# Patient Record
Sex: MALE | Race: WHITE | Employment: FULL TIME | URBAN - METROPOLITAN AREA
[De-identification: names, ages, dates, MRNs, and addresses within clinical notes are randomized per-mention and may not be internally consistent; named-entity substitution may affect disease eponyms.]

---

## 2017-02-14 DIAGNOSIS — G43.709 CHRONIC MIGRAINE WITHOUT AURA WITHOUT STATUS MIGRAINOSUS, NOT INTRACTABLE: ICD-10-CM

## 2017-02-14 RX ORDER — SUMATRIPTAN 100 MG/1
TABLET, FILM COATED ORAL
Qty: 9 TABLET | Refills: 1 | Status: SHIPPED | OUTPATIENT
Start: 2017-02-14 | End: 2017-08-16 | Stop reason: SDUPTHER

## 2017-04-13 RX ORDER — SUMATRIPTAN 50 MG/1
TABLET, FILM COATED ORAL
Qty: 9 TABLET | Refills: 1 | Status: SHIPPED | OUTPATIENT
Start: 2017-04-13 | End: 2017-06-06 | Stop reason: SDUPTHER

## 2017-06-07 RX ORDER — SUMATRIPTAN 50 MG/1
TABLET, FILM COATED ORAL
Qty: 9 TABLET | Refills: 0 | Status: SHIPPED | OUTPATIENT
Start: 2017-06-07 | End: 2017-09-01

## 2017-08-16 DIAGNOSIS — G43.709 CHRONIC MIGRAINE WITHOUT AURA WITHOUT STATUS MIGRAINOSUS, NOT INTRACTABLE: ICD-10-CM

## 2017-08-16 RX ORDER — SUMATRIPTAN 100 MG/1
TABLET, FILM COATED ORAL
Qty: 9 TABLET | Refills: 0 | Status: SHIPPED | OUTPATIENT
Start: 2017-08-16 | End: 2017-08-22 | Stop reason: SDUPTHER

## 2017-08-22 DIAGNOSIS — G43.709 CHRONIC MIGRAINE WITHOUT AURA WITHOUT STATUS MIGRAINOSUS, NOT INTRACTABLE: ICD-10-CM

## 2017-08-22 RX ORDER — SUMATRIPTAN 100 MG/1
TABLET, FILM COATED ORAL
Qty: 9 TABLET | Refills: 0 | Status: SHIPPED | OUTPATIENT
Start: 2017-08-22 | End: 2017-09-01 | Stop reason: SDUPTHER

## 2017-09-01 ENCOUNTER — OFFICE VISIT (OUTPATIENT)
Dept: FAMILY MEDICINE CLINIC | Age: 22
End: 2017-09-01
Payer: COMMERCIAL

## 2017-09-01 VITALS
HEIGHT: 70 IN | RESPIRATION RATE: 16 BRPM | SYSTOLIC BLOOD PRESSURE: 123 MMHG | WEIGHT: 165.2 LBS | HEART RATE: 56 BPM | BODY MASS INDEX: 23.65 KG/M2 | TEMPERATURE: 96.6 F | DIASTOLIC BLOOD PRESSURE: 77 MMHG

## 2017-09-01 DIAGNOSIS — G43.709 CHRONIC MIGRAINE WITHOUT AURA WITHOUT STATUS MIGRAINOSUS, NOT INTRACTABLE: ICD-10-CM

## 2017-09-01 DIAGNOSIS — Z00.00 ANNUAL PHYSICAL EXAM: Primary | ICD-10-CM

## 2017-09-01 PROCEDURE — 99395 PREV VISIT EST AGE 18-39: CPT | Performed by: INTERNAL MEDICINE

## 2017-09-01 RX ORDER — TOPIRAMATE 25 MG/1
25 TABLET ORAL 2 TIMES DAILY
Qty: 60 TABLET | Refills: 3 | Status: SHIPPED | OUTPATIENT
Start: 2017-09-01 | End: 2018-01-05 | Stop reason: SDUPTHER

## 2017-09-01 RX ORDER — SUMATRIPTAN 100 MG/1
TABLET, FILM COATED ORAL
Qty: 9 TABLET | Refills: 3 | Status: SHIPPED | OUTPATIENT
Start: 2017-09-01 | End: 2017-12-26 | Stop reason: SDUPTHER

## 2017-09-01 ASSESSMENT — ENCOUNTER SYMPTOMS
ABDOMINAL PAIN: 0
BLURRED VISION: 0
COUGH: 0
CONSTIPATION: 0
PHOTOPHOBIA: 0
NAUSEA: 0
SORE THROAT: 0
SHORTNESS OF BREATH: 0
EYE REDNESS: 0

## 2017-12-26 DIAGNOSIS — G43.709 CHRONIC MIGRAINE WITHOUT AURA WITHOUT STATUS MIGRAINOSUS, NOT INTRACTABLE: ICD-10-CM

## 2017-12-26 RX ORDER — SUMATRIPTAN 100 MG/1
TABLET, FILM COATED ORAL
Qty: 9 TABLET | Refills: 2 | Status: SHIPPED | OUTPATIENT
Start: 2017-12-26 | End: 2018-05-14 | Stop reason: SDUPTHER

## 2018-01-05 ENCOUNTER — OFFICE VISIT (OUTPATIENT)
Dept: FAMILY MEDICINE CLINIC | Age: 23
End: 2018-01-05
Payer: COMMERCIAL

## 2018-01-05 VITALS
HEART RATE: 65 BPM | SYSTOLIC BLOOD PRESSURE: 106 MMHG | TEMPERATURE: 97.4 F | HEIGHT: 70 IN | BODY MASS INDEX: 23.54 KG/M2 | RESPIRATION RATE: 16 BRPM | WEIGHT: 164.4 LBS | DIASTOLIC BLOOD PRESSURE: 54 MMHG

## 2018-01-05 DIAGNOSIS — G43.709 CHRONIC MIGRAINE WITHOUT AURA WITHOUT STATUS MIGRAINOSUS, NOT INTRACTABLE: ICD-10-CM

## 2018-01-05 PROCEDURE — G8420 CALC BMI NORM PARAMETERS: HCPCS | Performed by: INTERNAL MEDICINE

## 2018-01-05 PROCEDURE — G8427 DOCREV CUR MEDS BY ELIG CLIN: HCPCS | Performed by: INTERNAL MEDICINE

## 2018-01-05 PROCEDURE — 1036F TOBACCO NON-USER: CPT | Performed by: INTERNAL MEDICINE

## 2018-01-05 PROCEDURE — 99213 OFFICE O/P EST LOW 20 MIN: CPT | Performed by: INTERNAL MEDICINE

## 2018-01-05 PROCEDURE — G8484 FLU IMMUNIZE NO ADMIN: HCPCS | Performed by: INTERNAL MEDICINE

## 2018-01-05 RX ORDER — TOPIRAMATE 25 MG/1
25 TABLET ORAL 2 TIMES DAILY
Qty: 60 TABLET | Refills: 3 | Status: SHIPPED | OUTPATIENT
Start: 2018-01-05 | End: 2019-02-25 | Stop reason: SDUPTHER

## 2018-01-05 ASSESSMENT — PATIENT HEALTH QUESTIONNAIRE - PHQ9
SUM OF ALL RESPONSES TO PHQ9 QUESTIONS 1 & 2: 0
2. FEELING DOWN, DEPRESSED OR HOPELESS: 0
1. LITTLE INTEREST OR PLEASURE IN DOING THINGS: 0
SUM OF ALL RESPONSES TO PHQ QUESTIONS 1-9: 0

## 2018-01-05 NOTE — PROGRESS NOTES
[facility]    Progress Note    Date of patient's visit: 1/5/2018    Patient's Name:  Jamal Gonzalez    YOB: 1995            Patient Care Team:  Sylvain Ramey MD as PCP - General (Internal Medicine)    REASON FOR VISIT: Routine outpatient follow     Chief Complaint   Patient presents with    3 Month Follow-Up         HISTORY OF PRESENT ILLNESS:    History was obtained from the patient. Jamal Gonzalez is a 25 y.o. is here for Follow-up on his chronic migraines. Last time he was complaining of increased frequency and severity of headaches with 2-3 episodes a week. He was started on Topamax 25 mg. He was also told to increase his Imitrex to 100 mg for acute headaches. He is feeling much better. Since he last saw me his frequency and intensity of headaches have recently improved. He is now only getting about 3 headaches a month. Before he was running out of Imitrex every month now he he says his Imitrex last him 2-3 months. Overall he is doing well. No other concerns. He is trying to eat more regularly daily and drink more fluids. He has not had his labs done yet. He has history of Hodgkin's lymphoma. His oncologist has now released him as it has been 5 years and he is in remission.     Past Medical History:   Diagnosis Date    Headache     Hodgkin's lymphoma [facility])        Past Surgical History:   Procedure Laterality Date    NECK SURGERY      lymph node removal and biopsy    TYMPANOSTOMY TUBE PLACEMENT           ALLERGIES      Allergies   Allergen Reactions    Sulfa Antibiotics Hives       MEDICATIONS:      Current Outpatient Prescriptions on File Prior to Visit   Medication Sig Dispense Refill    SUMAtriptan (IMITREX) 100 MG tablet TAKE 1 TABLET BY MOUTH ONE TIME A DAY AS NEEDED FOR MIGRAINE 9 tablet 2    topiramate (TOPAMAX) 25 MG tablet Take 1 tablet by mouth 2 times daily 60 tablet 3     No current facility-administered medications

## 2018-01-07 ASSESSMENT — ENCOUNTER SYMPTOMS
ABDOMINAL PAIN: 0
SPUTUM PRODUCTION: 0
CONSTIPATION: 0
SHORTNESS OF BREATH: 0
BLURRED VISION: 0
SINUS PAIN: 0
COUGH: 0
NAUSEA: 0
PHOTOPHOBIA: 0

## 2018-05-14 DIAGNOSIS — G43.709 CHRONIC MIGRAINE WITHOUT AURA WITHOUT STATUS MIGRAINOSUS, NOT INTRACTABLE: ICD-10-CM

## 2018-05-15 RX ORDER — SUMATRIPTAN 100 MG/1
TABLET, FILM COATED ORAL
Qty: 9 TABLET | Refills: 3 | Status: SHIPPED | OUTPATIENT
Start: 2018-05-15 | End: 2018-10-15 | Stop reason: SDUPTHER

## 2018-10-15 ENCOUNTER — OFFICE VISIT (OUTPATIENT)
Dept: FAMILY MEDICINE CLINIC | Age: 23
End: 2018-10-15
Payer: COMMERCIAL

## 2018-10-15 VITALS
SYSTOLIC BLOOD PRESSURE: 99 MMHG | TEMPERATURE: 97.4 F | WEIGHT: 165 LBS | DIASTOLIC BLOOD PRESSURE: 56 MMHG | BODY MASS INDEX: 23.62 KG/M2 | HEIGHT: 70 IN | HEART RATE: 51 BPM | RESPIRATION RATE: 16 BRPM

## 2018-10-15 DIAGNOSIS — G43.709 CHRONIC MIGRAINE WITHOUT AURA WITHOUT STATUS MIGRAINOSUS, NOT INTRACTABLE: ICD-10-CM

## 2018-10-15 PROCEDURE — G8484 FLU IMMUNIZE NO ADMIN: HCPCS | Performed by: PHYSICIAN ASSISTANT

## 2018-10-15 PROCEDURE — G8420 CALC BMI NORM PARAMETERS: HCPCS | Performed by: PHYSICIAN ASSISTANT

## 2018-10-15 PROCEDURE — 99213 OFFICE O/P EST LOW 20 MIN: CPT | Performed by: PHYSICIAN ASSISTANT

## 2018-10-15 PROCEDURE — G8427 DOCREV CUR MEDS BY ELIG CLIN: HCPCS | Performed by: PHYSICIAN ASSISTANT

## 2018-10-15 PROCEDURE — 1036F TOBACCO NON-USER: CPT | Performed by: PHYSICIAN ASSISTANT

## 2018-10-15 RX ORDER — SUMATRIPTAN 100 MG/1
TABLET, FILM COATED ORAL
Qty: 9 TABLET | Refills: 3 | Status: SHIPPED | OUTPATIENT
Start: 2018-10-15 | End: 2018-12-13 | Stop reason: SDUPTHER

## 2018-10-15 ASSESSMENT — ENCOUNTER SYMPTOMS
VOMITING: 0
BACK PAIN: 0
SHORTNESS OF BREATH: 0
NAUSEA: 0

## 2018-12-13 DIAGNOSIS — G43.709 CHRONIC MIGRAINE WITHOUT AURA WITHOUT STATUS MIGRAINOSUS, NOT INTRACTABLE: ICD-10-CM

## 2018-12-14 RX ORDER — SUMATRIPTAN 100 MG/1
TABLET, FILM COATED ORAL
Qty: 9 TABLET | Refills: 1 | Status: SHIPPED | OUTPATIENT
Start: 2018-12-14 | End: 2019-02-11 | Stop reason: SDUPTHER

## 2019-02-25 ENCOUNTER — OFFICE VISIT (OUTPATIENT)
Dept: FAMILY MEDICINE CLINIC | Age: 24
End: 2019-02-25
Payer: COMMERCIAL

## 2019-02-25 VITALS
WEIGHT: 175 LBS | SYSTOLIC BLOOD PRESSURE: 120 MMHG | RESPIRATION RATE: 16 BRPM | TEMPERATURE: 97 F | BODY MASS INDEX: 25.05 KG/M2 | HEIGHT: 70 IN | HEART RATE: 64 BPM | DIASTOLIC BLOOD PRESSURE: 72 MMHG

## 2019-02-25 DIAGNOSIS — G43.709 CHRONIC MIGRAINE WITHOUT AURA WITHOUT STATUS MIGRAINOSUS, NOT INTRACTABLE: ICD-10-CM

## 2019-02-25 PROCEDURE — 99213 OFFICE O/P EST LOW 20 MIN: CPT | Performed by: INTERNAL MEDICINE

## 2019-02-25 PROCEDURE — G8427 DOCREV CUR MEDS BY ELIG CLIN: HCPCS | Performed by: INTERNAL MEDICINE

## 2019-02-25 PROCEDURE — G8484 FLU IMMUNIZE NO ADMIN: HCPCS | Performed by: INTERNAL MEDICINE

## 2019-02-25 PROCEDURE — 1036F TOBACCO NON-USER: CPT | Performed by: INTERNAL MEDICINE

## 2019-02-25 PROCEDURE — G8419 CALC BMI OUT NRM PARAM NOF/U: HCPCS | Performed by: INTERNAL MEDICINE

## 2019-02-25 RX ORDER — TOPIRAMATE 50 MG/1
50 TABLET, FILM COATED ORAL 2 TIMES DAILY
Qty: 60 TABLET | Refills: 3 | Status: SHIPPED | OUTPATIENT
Start: 2019-02-25 | End: 2019-06-03 | Stop reason: SDUPTHER

## 2019-02-25 ASSESSMENT — PATIENT HEALTH QUESTIONNAIRE - PHQ9
SUM OF ALL RESPONSES TO PHQ QUESTIONS 1-9: 0
2. FEELING DOWN, DEPRESSED OR HOPELESS: 0
SUM OF ALL RESPONSES TO PHQ9 QUESTIONS 1 & 2: 0
SUM OF ALL RESPONSES TO PHQ QUESTIONS 1-9: 0
1. LITTLE INTEREST OR PLEASURE IN DOING THINGS: 0

## 2019-03-29 DIAGNOSIS — G43.709 CHRONIC MIGRAINE WITHOUT AURA WITHOUT STATUS MIGRAINOSUS, NOT INTRACTABLE: ICD-10-CM

## 2019-03-29 RX ORDER — SUMATRIPTAN 100 MG/1
TABLET, FILM COATED ORAL
Qty: 9 TABLET | Refills: 3 | Status: SHIPPED | OUTPATIENT
Start: 2019-03-29 | End: 2020-01-21 | Stop reason: SDUPTHER

## 2019-06-03 ENCOUNTER — OFFICE VISIT (OUTPATIENT)
Dept: FAMILY MEDICINE CLINIC | Age: 24
End: 2019-06-03
Payer: COMMERCIAL

## 2019-06-03 VITALS
TEMPERATURE: 97.6 F | DIASTOLIC BLOOD PRESSURE: 71 MMHG | HEART RATE: 54 BPM | WEIGHT: 179.2 LBS | HEIGHT: 70 IN | BODY MASS INDEX: 25.65 KG/M2 | SYSTOLIC BLOOD PRESSURE: 117 MMHG | RESPIRATION RATE: 16 BRPM

## 2019-06-03 DIAGNOSIS — G43.709 CHRONIC MIGRAINE WITHOUT AURA WITHOUT STATUS MIGRAINOSUS, NOT INTRACTABLE: Primary | ICD-10-CM

## 2019-06-03 PROCEDURE — 99213 OFFICE O/P EST LOW 20 MIN: CPT | Performed by: INTERNAL MEDICINE

## 2019-06-03 PROCEDURE — G8419 CALC BMI OUT NRM PARAM NOF/U: HCPCS | Performed by: INTERNAL MEDICINE

## 2019-06-03 PROCEDURE — G8427 DOCREV CUR MEDS BY ELIG CLIN: HCPCS | Performed by: INTERNAL MEDICINE

## 2019-06-03 PROCEDURE — 1036F TOBACCO NON-USER: CPT | Performed by: INTERNAL MEDICINE

## 2019-06-03 RX ORDER — TOPIRAMATE 50 MG/1
50 TABLET, FILM COATED ORAL 2 TIMES DAILY
Qty: 60 TABLET | Refills: 11 | Status: SHIPPED | OUTPATIENT
Start: 2019-06-03 | End: 2020-09-10

## 2019-06-03 NOTE — PROGRESS NOTES
Visit Information    Have you changed or started any medications since your last visit including any over-the-counter medicines, vitamins, or herbal medicines? no   Are you having any side effects from any of your medications? -  no  Have you stopped taking any of your medications? Is so, why? -  no    Have you seen any other physician or provider since your last visit? No  Have you had any other diagnostic tests since your last visit? No  Have you been seen in the emergency room and/or had an admission to a hospital since we last saw you? No  Have you had your routine dental cleaning in the past 6 months? no    Have you activated your [employer] account? If not, what are your barriers?  No:      Patient Care Team:  Mary Elizabeth PA-C as PCP - General (Physician Assistant)  Mary Elizabeth PA-C as PCP - [facility]    Medical History Review  Past Medical, Family, and Social History reviewed and does not contribute to the patient presenting condition    Health Maintenance   Topic Date Due    HIV screen  03/24/2010    Varicella Vaccine (2 of 2 - 13+ 2-dose series) 12/20/2011    DTaP/Tdap/Td vaccine (6 - Tdap) 03/07/2017    Flu vaccine (Season Ended) 09/01/2019    HPV vaccine  Aged Out    Pneumococcal 0-64 years Vaccine  Aged Out

## 2019-06-03 NOTE — PROGRESS NOTES
[facility]    Progress Note    Date of patient's visit: 6/3/2019    Patient's Name:  Soumya Cordero    YOB: 1995            Patient Care Team:  Anatoly Fajardo PA-C as PCP - General (Physician Assistant)  Anatoly Fajardo PA-C as PCP - [facility]    REASON FOR VISIT: Routine outpatient follow     Chief Complaint   Patient presents with    3 Month Follow-Up         HISTORY OF PRESENT ILLNESS:    History was obtained from the patient. Soumya Cordero is a 25 y.o. is here for follow-up of his chronic migraines. Last time Topamax was increased 200 mg daily as he was having increasing headaches. He says he is doing much better. He is currently having one migraine headache a month. He is sleeping better. He is exercising daily. He has a history of Hodgkin's lymphoma. He has not seen his oncologist in 2 years. He was advised that he was in remission after 5 years of follow-up. He has not had any labs done since then. He is advised to get a CBC and other labs done but he is refusing at this point. He is also on Topamax and I would like to check a renal function. He says he'll talk to his mother and let us know. Past Medical History:   Diagnosis Date    Headache     Hodgkin's lymphoma [facility])        Past Surgical History:   Procedure Laterality Date    NECK SURGERY      lymph node removal and biopsy    TYMPANOSTOMY TUBE PLACEMENT           ALLERGIES      Allergies   Allergen Reactions    Sulfa Antibiotics Hives       MEDICATIONS:      Current Outpatient Medications on File Prior to Visit   Medication Sig Dispense Refill    SUMAtriptan (IMITREX) 100 MG tablet TAKE ONE TABLET BY MOUTH EVERY DAY AS NEEDED FOR MIGRAINE 9 tablet 3    topiramate (TOPAMAX) 50 MG tablet Take 1 tablet by mouth 2 times daily 60 tablet 3     No current facility-administered medications on file prior to visit.         SOCIAL HISTORY    Reviewed and no change from previous record. Latonya Lew  reports that he has never smoked. He has never used smokeless tobacco.    FAMILY HISTORY:    Reviewed and No change from previous visit    HEALTH MAINTENANCE DUE:      Health Maintenance Due   Topic Date Due    HIV screen  03/24/2010    Varicella Vaccine (2 of 2 - 13+ 2-dose series) 12/20/2011    DTaP/Tdap/Td vaccine (6 - Tdap) 03/07/2017       REVIEW OF SYSTEMS:    12 point review of symptoms completed and found to be normal except noted in the HPI    Review of Systems   Constitutional: Negative for fever, malaise/fatigue and weight loss. HENT: Negative for congestion, sinus pain and tinnitus.    Eyes: Negative for blurred vision and photophobia. Respiratory: Negative for cough, sputum production and shortness of breath.    Cardiovascular: Negative for chest pain, palpitations and leg swelling. Gastrointestinal: Negative for abdominal pain, constipation and nausea. Neurological: Positive for headaches. Negative for dizziness, sensory change, focal weakness and loss of consciousness. Psychiatric/Behavioral: Negative for depression and substance abuse. The patient is not nervous/anxious. PHYSICAL EXAM:     Vitals:    06/03/19 1835   BP: 117/71   Site: Left Upper Arm   Position: Sitting   Cuff Size: Medium Adult   Pulse: 54   Resp: 16   Temp: 97.6 °F (36.4 °C)   TempSrc: Oral   Weight: 179 lb 3.2 oz (81.3 kg)   Height: 5' 10.08\" (1.78 m)     Body mass index is 25.65 kg/m². BP Readings from Last 3 Encounters:   06/03/19 117/71   02/25/19 120/72   10/15/18 (!) 99/56        Wt Readings from Last 3 Encounters:   06/03/19 179 lb 3.2 oz (81.3 kg)   02/25/19 175 lb (79.4 kg)   10/15/18 165 lb (74.8 kg)       Physical Exam    HENT:  Normocephalic, Atraumatic. Neck- Normal range of motion, No tenderness, Supple. Eyes:  PERRL, EOMI, Conjunctiva normal, No discharge. Respiratory:  Normal breath sounds, No respiratory distress, No wheezing, No chest tenderness. Cardiovascular:  Normal heart rate, Normal rhythm, No murmurs  Musculoskeletal:  Intact distal pulses, No edema, No tenderness  Integument:  Warm, Dry, No erythema, No rash. Lymphatic:  No lymphadenopathy noted. Neurologic:  Alert & oriented x 3, Normal motor function, Normal sensory function, No focal deficits noted. Psychiatric:  Affect normal      LABORATORY FINDINGS:    CBC:No results found for: WBC, HGB, PLT  BMP:  No results found for: NA, K, CL, CO2, BUN, CREATININE, GLUCOSE  HEMOGLOBIN A1C: No results found for: LABA1C  MICROALBUMIN URINE: No results found for: MICROALBUR  FASTING LIPID Hoagland@[email or web address]  No results found for: LDLCALC, LDLCHOLESTEROL, LDLDIRECT    LIVER PROFILE:No results found for: ALT, AST, PROT, BILITOT, BILIDIR, LABALBU   THYROID FUNCTION: No results found for: TSH   URINEANALYSIS: No results found for: LABURIN  ASSESSMENT AND PLAN:    1. Chronic migraine without aura without status migrainosus, not intractable    - topiramate (TOPAMAX) 50 MG tablet; Take 1 tablet by mouth 2 times daily  Dispense: 60 tablet; Refill: 11          FOLLOW UP AND INSTRUCTIONS:   Return in about 1 year (around 6/3/2020). 1. Kana Darnell received counseling on the following healthy behaviors: nutrition, exercise and medication adherence    2. Reviewed prior labs and health maintenance. 3. Discussed use, benefit, and side effects of prescribed medications. Barriers to medication compliance addressed. All patient questions answered. Pt voiced understanding.        Adrian Thomas  Attending Physician, [address] [place] [address], Internal Medicine Residency Program  [address] [place] [address]  6/3/2019, 6:51 PM

## 2020-01-21 RX ORDER — SUMATRIPTAN 100 MG/1
TABLET, FILM COATED ORAL
Qty: 9 TABLET | Refills: 3 | Status: SHIPPED | OUTPATIENT
Start: 2020-01-21 | End: 2020-08-17 | Stop reason: SDUPTHER

## 2020-01-21 NOTE — TELEPHONE ENCOUNTER
Electronic medication refill request. Pharmacy on file. Please advise. Last visit: 6/3/19  Last Med refill: 6/29/19    Next Visit Date:  No future appointments.     Health Maintenance   Topic Date Due    HIV screen  03/24/2010    Varicella Vaccine (2 of 2 - 13+ 2-dose series) 12/20/2011    DTaP/Tdap/Td vaccine (6 - Tdap) 03/07/2017    Flu vaccine (1) 09/01/2019    HPV vaccine  Aged Out    Pneumococcal 0-64 years Vaccine  Aged Out       No results found for: LABA1C          ( goal A1C is < 7)   No results found for: LABMICR  No results found for: LDLCHOLESTEROL, LDLCALC    (goal LDL is <100)   No results found for: AST, ALT, BUN  BP Readings from Last 3 Encounters:   06/03/19 117/71   02/25/19 120/72   10/15/18 (!) 99/56          (goal 120/80)    All Future Testing planned in [facility]              Patient Active Problem List:     Chronic migraine without aura without status migrainosus, not intractable

## 2020-02-17 ENCOUNTER — TELEPHONE (OUTPATIENT)
Dept: FAMILY MEDICINE CLINIC | Age: 25
End: 2020-02-17

## 2020-02-17 NOTE — TELEPHONE ENCOUNTER
PT's Mother called in requesting a referral to a foot specialist. Mom stated PT has an ingrown toe nail on his big toe left foot. Mom didn't want to schedule an appointment if something could not be done in office. Mom also requested a phone call back from the office. Please advise thank you!

## 2020-05-27 RX ORDER — HYDROXYZINE PAMOATE 25 MG/1
25 CAPSULE ORAL 3 TIMES DAILY PRN
Qty: 15 CAPSULE | Refills: 0 | Status: SHIPPED | OUTPATIENT
Start: 2020-05-27 | End: 2020-06-10

## 2020-08-17 RX ORDER — SUMATRIPTAN 100 MG/1
TABLET, FILM COATED ORAL
Qty: 9 TABLET | Refills: 3 | Status: SHIPPED | OUTPATIENT
Start: 2020-08-17 | End: 2020-09-10 | Stop reason: SDUPTHER

## 2020-08-17 NOTE — TELEPHONE ENCOUNTER
patient has an appointment scheduled with Ellen on 9/2/20    Next Visit Date:  No future appointments.     Health Maintenance   Topic Date Due    HPV vaccine (1 - Male 2-dose series) 03/24/2006    HIV screen  03/24/2010    Varicella vaccine (2 of 2 - 13+ 2-dose series) 12/20/2011    DTaP/Tdap/Td vaccine (6 - Tdap) 03/07/2017    Flu vaccine (1) 09/01/2020    Hib vaccine  Completed    Hepatitis A vaccine  Aged Out    Hepatitis B vaccine  Aged Out    Meningococcal (ACWY) vaccine  Aged Out    Pneumococcal 0-64 years Vaccine  Aged Out       No results found for: LABA1C          ( goal A1C is < 7)   No results found for: LABMICR  No results found for: LDLCHOLESTEROL, LDLCALC    (goal LDL is <100)   No results found for: AST, ALT, BUN  BP Readings from Last 3 Encounters:   06/03/19 117/71   02/25/19 120/72   10/15/18 (!) 99/56          (goal 120/80)    All Future Testing planned in [facility]              Patient Active Problem List:     Chronic migraine without aura without status migrainosus, not intractable

## 2020-09-10 ENCOUNTER — VIRTUAL VISIT (OUTPATIENT)
Dept: FAMILY MEDICINE CLINIC | Age: 25
End: 2020-09-10
Payer: COMMERCIAL

## 2020-09-10 PROCEDURE — 99213 OFFICE O/P EST LOW 20 MIN: CPT | Performed by: PHYSICIAN ASSISTANT

## 2020-09-10 RX ORDER — SUMATRIPTAN 100 MG/1
TABLET, FILM COATED ORAL
Qty: 9 TABLET | Refills: 3 | Status: SHIPPED | OUTPATIENT
Start: 2020-09-10 | End: 2021-02-27

## 2020-09-10 SDOH — ECONOMIC STABILITY: FOOD INSECURITY: WITHIN THE PAST 12 MONTHS, YOU WORRIED THAT YOUR FOOD WOULD RUN OUT BEFORE YOU GOT MONEY TO BUY MORE.: NEVER TRUE

## 2020-09-10 SDOH — ECONOMIC STABILITY: FOOD INSECURITY: WITHIN THE PAST 12 MONTHS, THE FOOD YOU BOUGHT JUST DIDN'T LAST AND YOU DIDN'T HAVE MONEY TO GET MORE.: NEVER TRUE

## 2020-09-10 SDOH — ECONOMIC STABILITY: TRANSPORTATION INSECURITY
IN THE PAST 12 MONTHS, HAS THE LACK OF TRANSPORTATION KEPT YOU FROM MEDICAL APPOINTMENTS OR FROM GETTING MEDICATIONS?: NO

## 2020-09-10 SDOH — ECONOMIC STABILITY: INCOME INSECURITY: HOW HARD IS IT FOR YOU TO PAY FOR THE VERY BASICS LIKE FOOD, HOUSING, MEDICAL CARE, AND HEATING?: NOT HARD AT ALL

## 2020-09-10 SDOH — ECONOMIC STABILITY: TRANSPORTATION INSECURITY
IN THE PAST 12 MONTHS, HAS LACK OF TRANSPORTATION KEPT YOU FROM MEETINGS, WORK, OR FROM GETTING THINGS NEEDED FOR DAILY LIVING?: NO

## 2020-09-10 ASSESSMENT — ENCOUNTER SYMPTOMS
COLOR CHANGE: 0
SINUS PRESSURE: 0
RECTAL PAIN: 0
SHORTNESS OF BREATH: 0
SINUS PAIN: 0
VOMITING: 0
ABDOMINAL DISTENTION: 0
TROUBLE SWALLOWING: 0
BACK PAIN: 0
ANAL BLEEDING: 0
PHOTOPHOBIA: 0
SORE THROAT: 0
NAUSEA: 0
RHINORRHEA: 0
CONSTIPATION: 0
COUGH: 0
WHEEZING: 0
CHEST TIGHTNESS: 0
ABDOMINAL PAIN: 0
BLOOD IN STOOL: 0
DIARRHEA: 0
EYE REDNESS: 0

## 2020-09-10 ASSESSMENT — PATIENT HEALTH QUESTIONNAIRE - PHQ9
1. LITTLE INTEREST OR PLEASURE IN DOING THINGS: 0
2. FEELING DOWN, DEPRESSED OR HOPELESS: 0
SUM OF ALL RESPONSES TO PHQ9 QUESTIONS 1 & 2: 0
SUM OF ALL RESPONSES TO PHQ QUESTIONS 1-9: 0
SUM OF ALL RESPONSES TO PHQ QUESTIONS 1-9: 0

## 2020-09-10 NOTE — PROGRESS NOTES
9/10/2020    TELEHEALTH EVALUATION -- Audio/Visual (During NVJXO-71 public health emergency)    HPI:    Nava Pierre (:  1995) has requested an audio/video evaluation for the following concern(s):    Patient is establishing care. Patient's only medical history is chronic migraines. He states that he gets them consistently once or twice monthly and well controlled with Imitrex as needed. Patient was placed on Topamax twice daily at one point but he states he never took it consistently. Patient has not taken it in a long time. Patient denies any current symptoms. Patient has no other concerns today. Review of Systems   Constitutional: Negative for appetite change, chills, diaphoresis, fatigue, fever and unexpected weight change. HENT: Negative for congestion, ear discharge, ear pain, postnasal drip, rhinorrhea, sinus pressure, sinus pain, sore throat, tinnitus and trouble swallowing. Eyes: Negative for photophobia, redness and visual disturbance. Respiratory: Negative for cough, chest tightness, shortness of breath and wheezing. Cardiovascular: Negative for chest pain, palpitations and leg swelling. Gastrointestinal: Negative for abdominal distention, abdominal pain, anal bleeding, blood in stool, constipation, diarrhea, nausea, rectal pain and vomiting. Endocrine: Negative. Genitourinary: Negative for decreased urine volume, difficulty urinating, dysuria, frequency, hematuria and urgency. Musculoskeletal: Negative for arthralgias, back pain, gait problem, joint swelling, myalgias, neck pain and neck stiffness. Skin: Negative for color change, pallor and rash. Neurological: Negative for dizziness, syncope, weakness, light-headedness, numbness and headaches. Hematological: Negative for adenopathy. Does not bruise/bleed easily.    Psychiatric/Behavioral: Negative for agitation, behavioral problems, confusion, decreased concentration, dysphoric mood, hallucinations, self-injury, sleep disturbance and suicidal ideas. The patient is not nervous/anxious and is not hyperactive. Prior to Visit Medications    Medication Sig Taking? Authorizing Provider   SUMAtriptan (IMITREX) 100 MG tablet TAKE ONE TABLET BY MOUTH EVERY DAY AS NEEDED FOR MIGRAINE Yes Azam Haq PA-C       Social History     Tobacco Use    Smoking status: Never Smoker    Smokeless tobacco: Never Used   Substance Use Topics    Alcohol use: No     Alcohol/week: 0.0 standard drinks    Drug use: No        Health Maintenance   Topic Date Due    HPV vaccine (1 - Male 2-dose series) 03/24/2006    Flu vaccine (1) 09/01/2020    Varicella vaccine (2 of 2 - 13+ 2-dose series) 10/01/2020 (Originally 12/20/2011)    DTaP/Tdap/Td vaccine (6 - Tdap) 09/10/2021 (Originally 3/7/2017)    HIV screen  09/10/2021 (Originally 3/24/2010)    Hib vaccine  Completed    Hepatitis A vaccine  Aged Out    Hepatitis B vaccine  Aged Out    Meningococcal (ACWY) vaccine  Aged Out    Pneumococcal 0-64 years Vaccine  Aged Out       PHYSICAL EXAMINATION:  [ INSTRUCTIONS:  \"[x]\" Indicates a positive item  \"[]\" Indicates a negative item  -- DELETE ALL ITEMS NOT EXAMINED]  Vital Signs: (As obtained by patient/caregiver or practitioner observation)    Blood pressure-  Heart rate-    Respiratory rate-    Temperature-  Pulse oximetry-     Constitutional: [x] Appears well-developed and well-nourished [x] No apparent distress      [] Abnormal-   Mental status  [x] Alert and awake  [x] Oriented to person/place/time [x]Able to follow commands      Eyes:  EOM    [x]  Normal  [] Abnormal-  Sclera  [x]  Normal  [] Abnormal -         Discharge [x]  None visible  [] Abnormal -    HENT:   [x] Normocephalic, atraumatic.   [] Abnormal   [x] Mouth/Throat: Mucous membranes are moist.     External Ears [x] Normal  [] Abnormal-     Neck: [x] No visualized mass     Pulmonary/Chest: [x] Respiratory effort normal.  [x] No visualized signs of difficulty breathing or respiratory distress        [] Abnormal-      Musculoskeletal:   [x] Normal gait with no signs of ataxia         [x] Normal range of motion of neck        [] Abnormal-       Neurological:        [x] No Facial Asymmetry (Cranial nerve 7 motor function) (limited exam to video visit)          [x] No gaze palsy        [] Abnormal-         Skin:        [x] No significant exanthematous lesions or discoloration noted on facial skin         [] Abnormal-            Psychiatric:       [x] Normal Affect [x] No Hallucinations        [] Abnormal-     Other pertinent observable physical exam findings-     ASSESSMENT/PLAN:  1. Encounter for medical examination to establish care  Get lab work done as ordered    2. Chronic migraine without aura without status migrainosus, not intractable  Take Imitrex as needed      No follow-ups on file. Quan Heredia is a 22 y.o. male being evaluated by a Virtual Visit (video visit) encounter to address concerns as mentioned above. A caregiver was present when appropriate. Due to this being a TeleHealth encounter (During QUDGO-73 public health emergency), evaluation of the following organ systems was limited: Vitals/Constitutional/EENT/Resp/CV/GI//MS/Neuro/Skin/Heme-Lymph-Imm. Pursuant to the emergency declaration under the [facility], [address] [place] [address] authority and the [employer] Act, this Virtual Visit was conducted with patient's (and/or legal guardian's) consent, to reduce the patient's risk of exposure to COVID-19 and provide necessary medical care. The patient (and/or legal guardian) has also been advised to contact this office for worsening conditions or problems, and seek emergency medical treatment and/or call 911 if deemed necessary.      Patient identification was verified at the start of the visit: Yes    Total time spent on this encounter: 20    Services were provided through a video

## 2020-11-16 ENCOUNTER — OFFICE VISIT (OUTPATIENT)
Dept: FAMILY MEDICINE CLINIC | Age: 25
End: 2020-11-16
Payer: COMMERCIAL

## 2020-11-16 VITALS
OXYGEN SATURATION: 98 % | TEMPERATURE: 97 F | HEART RATE: 70 BPM | RESPIRATION RATE: 16 BRPM | BODY MASS INDEX: 27.92 KG/M2 | HEIGHT: 70 IN | WEIGHT: 195 LBS | SYSTOLIC BLOOD PRESSURE: 120 MMHG | DIASTOLIC BLOOD PRESSURE: 74 MMHG

## 2020-11-16 PROCEDURE — 99212 OFFICE O/P EST SF 10 MIN: CPT | Performed by: PHYSICIAN ASSISTANT

## 2020-11-16 NOTE — PROGRESS NOTES
[address] PRIMARY CARE  [address] [place] [id number]  Dept: [phone]  Dept Fax: [phone]    Office Progress Note  Date of patient's visit: 11/18/2020  Patient's Name:  Joya Núñez YOB: 1995            CELE RUIZ PA  ================================================================    REASON FOR VISIT/CHIEF COMPLAINT:  Otalgia (right )    HISTORY OF PRESENTING ILLNESS:  History was obtained from: patient. Joya Núñez is a 22 y.o. female who presents with c/o right earache. Patient states that he has had pressure in the right ear for the last 2 weeks without any fevers, chills, sinus congestion, sore throat or cough. Patient has been using Debrox drops intermittently for the last couple of days but not consistently. Patient Active Problem List   Diagnosis    Chronic migraine without aura without status migrainosus, not intractable       Health Maintenance Due   Topic Date Due    HPV vaccine (1 - Male 2-dose series) 03/24/2006    Varicella vaccine (2 of 2 - 13+ 2-dose series) 12/20/2011    Flu vaccine (1) 09/01/2020       Allergies   Allergen Reactions    Sulfa Antibiotics Hives         Current Outpatient Medications   Medication Sig Dispense Refill    SUMAtriptan (IMITREX) 100 MG tablet TAKE ONE TABLET BY MOUTH EVERY DAY AS NEEDED FOR MIGRAINE 9 tablet 3     No current facility-administered medications for this visit. Social History     Tobacco Use    Smoking status: Never Smoker    Smokeless tobacco: Never Used   Substance Use Topics    Alcohol use: No     Alcohol/week: 0.0 standard drinks    Drug use: No       Family History   Problem Relation Age of Onset    Other Mother         migraines    Cancer Maternal Grandfather         brain    Cancer Paternal Grandmother         jaw cancer        Review of Systems   Constitutional: Negative for appetite change, chills, fatigue and fever.    HENT: Positive for ear pain. Negative for congestion, dental problem, drooling, ear discharge, facial swelling, hearing loss, mouth sores, nosebleeds, postnasal drip, rhinorrhea, sinus pressure, sinus pain, sneezing, sore throat, tinnitus, trouble swallowing and voice change. Respiratory: Negative for cough, chest tightness, shortness of breath and wheezing. Cardiovascular: Negative for chest pain and palpitations. Gastrointestinal: Negative for nausea and vomiting. Neurological: Negative for dizziness, light-headedness and headaches. Physical Exam  Vitals signs and nursing note reviewed. Constitutional:       General: He is not in acute distress. Appearance: Normal appearance. He is not ill-appearing, toxic-appearing or diaphoretic. HENT:      Head: Normocephalic and atraumatic. Right Ear: Hearing normal. No decreased hearing noted. No laceration, drainage, swelling or tenderness. No middle ear effusion. There is impacted cerumen. No foreign body. No mastoid tenderness. No PE tube. Left Ear: Hearing normal. No decreased hearing noted. No laceration, drainage, swelling or tenderness. No middle ear effusion. There is impacted cerumen. No foreign body. No mastoid tenderness. No PE tube. Ears:      Comments: Patient is nontender over the bilateral tragus pinna and mastoid process. Impacted cerumen bilaterally without signs of infection. Unable to visualize TMs     Nose: Nose normal.      Mouth/Throat:      Mouth: Mucous membranes are moist.   Eyes:      General: No scleral icterus. Right eye: No discharge. Left eye: No discharge. Conjunctiva/sclera: Conjunctivae normal.   Skin:     General: Skin is warm and dry. Neurological:      General: No focal deficit present. Mental Status: He is alert and oriented to person, place, and time. Psychiatric:         Mood and Affect: Mood normal.         Behavior: Behavior normal.         Thought Content:  Thought content normal. Judgment: Judgment normal.         Vitals:    11/16/20 1116   BP: 120/74   Site: Left Upper Arm   Position: Sitting   Cuff Size: Medium Adult   Pulse: 70   Resp: 16   Temp: 97 °F (36.1 °C)   TempSrc: Temporal   SpO2: 98%   Weight: 195 lb (88.5 kg)   Height: 5' 10.08\" (1.78 m)     BP Readings from Last 3 Encounters:   11/16/20 120/74   06/03/19 117/71   02/25/19 120/72              DIAGNOSTIC FINDINGS:  CBC:No results found for: WBC, HGB, PLT    BMP:  No results found for: NA, K, CL, CO2, BUN, CREATININE, GLUCOSE      FASTING LIPID PANEL:No results found for: CHOL, HDL, TRIG    No results found for this visit on 11/16/20. ASSESSMENT AND PLAN:   Diagnosis Orders   1. Otalgia of both ears  Merline Moeller MD, Otolaryngology, [facility]   2. Bilateral impacted cerumen  Merline Moeller MD, Otolaryngology, [facility]     Attempted bilateral ear irrigation with minimal relief. Significant cerumen impaction against TMs bilaterally. Patient is instructed to use Debrox consistently for the next week and return to the office for reevaluation and irrigation    FOLLOW UP AND INSTRUCTIONS:  Return in about 1 week (around 11/23/2020), or if symptoms worsen or fail to improve. · Discussed use, benefit, and side effects of prescribed medications. Barriers to medication compliance addressed. All patient questions answered. Pt voiced understanding. · Patient instructed to return to the office if symptoms do not resolve or go directly to the ER if the symptoms worsen - patient voiced understanding.     · Patient given educational materials - see patient instructions    Scanlon Proc. Dior Minesh [address] [place] [address]  11/18/2020, 11:13 AM    This note is created with the assistance of a speech-recognition program. While intending to generate a document that actually reflects the content of the visit, the document can still have some mistakes which may not have been identified and corrected by

## 2020-11-18 ASSESSMENT — ENCOUNTER SYMPTOMS
TROUBLE SWALLOWING: 0
SORE THROAT: 0
SINUS PAIN: 0
VOICE CHANGE: 0
NAUSEA: 0
FACIAL SWELLING: 0
COUGH: 0
VOMITING: 0
RHINORRHEA: 0
CHEST TIGHTNESS: 0
WHEEZING: 0
SINUS PRESSURE: 0
SHORTNESS OF BREATH: 0

## 2021-05-27 ENCOUNTER — OFFICE VISIT (OUTPATIENT)
Dept: FAMILY MEDICINE CLINIC | Age: 26
End: 2021-05-27
Payer: COMMERCIAL

## 2021-05-27 VITALS
DIASTOLIC BLOOD PRESSURE: 89 MMHG | WEIGHT: 186 LBS | HEIGHT: 71 IN | TEMPERATURE: 98 F | BODY MASS INDEX: 26.04 KG/M2 | SYSTOLIC BLOOD PRESSURE: 130 MMHG

## 2021-05-27 DIAGNOSIS — Z11.3 ROUTINE SCREENING FOR STI (SEXUALLY TRANSMITTED INFECTION): ICD-10-CM

## 2021-05-27 DIAGNOSIS — Z13.29 THYROID DISORDER SCREENING: ICD-10-CM

## 2021-05-27 DIAGNOSIS — E55.9 VITAMIN D DEFICIENCY: ICD-10-CM

## 2021-05-27 DIAGNOSIS — Z13.0 SCREENING FOR BLOOD DISEASE: ICD-10-CM

## 2021-05-27 DIAGNOSIS — Z11.4 ENCOUNTER FOR SCREENING FOR HIV: ICD-10-CM

## 2021-05-27 DIAGNOSIS — G43.709 CHRONIC MIGRAINE WITHOUT AURA WITHOUT STATUS MIGRAINOSUS, NOT INTRACTABLE: ICD-10-CM

## 2021-05-27 DIAGNOSIS — Z13.21 ENCOUNTER FOR VITAMIN DEFICIENCY SCREENING: ICD-10-CM

## 2021-05-27 DIAGNOSIS — Z00.00 HEALTHCARE MAINTENANCE: Primary | ICD-10-CM

## 2021-05-27 DIAGNOSIS — Z13.220 LIPID SCREENING: ICD-10-CM

## 2021-05-27 DIAGNOSIS — Z13.1 DIABETES MELLITUS SCREENING: ICD-10-CM

## 2021-05-27 DIAGNOSIS — Z11.59 NEED FOR HEPATITIS C SCREENING TEST: ICD-10-CM

## 2021-05-27 DIAGNOSIS — Z85.71 HISTORY OF HODGKIN'S DISEASE: ICD-10-CM

## 2021-05-27 PROCEDURE — 99395 PREV VISIT EST AGE 18-39: CPT | Performed by: FAMILY MEDICINE

## 2021-05-27 RX ORDER — SUMATRIPTAN 100 MG/1
TABLET, FILM COATED ORAL
Qty: 27 TABLET | Refills: 1 | Status: SHIPPED | OUTPATIENT
Start: 2021-05-27 | End: 2021-09-30

## 2021-05-27 ASSESSMENT — ENCOUNTER SYMPTOMS
EYES NEGATIVE: 1
RESPIRATORY NEGATIVE: 1
GASTROINTESTINAL NEGATIVE: 1

## 2021-05-27 ASSESSMENT — PATIENT HEALTH QUESTIONNAIRE - PHQ9
SUM OF ALL RESPONSES TO PHQ QUESTIONS 1-9: 2
2. FEELING DOWN, DEPRESSED OR HOPELESS: 1
SUM OF ALL RESPONSES TO PHQ9 QUESTIONS 1 & 2: 2

## 2021-05-27 NOTE — PROGRESS NOTES
Neurological: Positive for headaches. Negative for dizziness, tremors, seizures, syncope, facial asymmetry, speech difficulty, weakness, light-headedness and numbness. Psychiatric/Behavioral: Negative. Objective:     Physical Exam  Vitals and nursing note reviewed. Constitutional:       General: He is awake. He is not in acute distress. Appearance: Normal appearance. He is normal weight. He is not ill-appearing, toxic-appearing or diaphoretic. HENT:      Head: Normocephalic and atraumatic. Right Ear: Tympanic membrane, ear canal and external ear normal.      Left Ear: Tympanic membrane, ear canal and external ear normal.      Nose: Nose normal.      Mouth/Throat:      Mouth: Mucous membranes are moist.   Eyes:      Extraocular Movements: Extraocular movements intact. Conjunctiva/sclera: Conjunctivae normal.      Pupils: Pupils are equal, round, and reactive to light. Cardiovascular:      Rate and Rhythm: Normal rate and regular rhythm. Pulses: Normal pulses. Heart sounds: Normal heart sounds. No murmur heard. No friction rub. No gallop. Pulmonary:      Effort: Pulmonary effort is normal. No respiratory distress. Breath sounds: Normal breath sounds. No stridor. No wheezing, rhonchi or rales. Abdominal:      General: Abdomen is flat. Bowel sounds are normal.      Palpations: Abdomen is soft. Musculoskeletal:         General: Normal range of motion. Cervical back: Normal range of motion and neck supple. Lymphadenopathy:      Head:      Right side of head: No submental, submandibular, tonsillar, preauricular, posterior auricular or occipital adenopathy. Left side of head: No submental, submandibular, tonsillar, preauricular, posterior auricular or occipital adenopathy. Cervical: No cervical adenopathy. Right cervical: No superficial, deep or posterior cervical adenopathy. Left cervical: No superficial, deep or posterior cervical adenopathy. encouraged to see dentist every 6 months (he just saw them last week), get 150 minutes of exercise per week, eat a healthy diet with fruit, vegetables and meats. The patient is exercising 4 days a week for at least an hour so he is meeting this measure. The patient's vaccines are mostly up-to-date except he has not gotten the HPV vaccine. He is hesitant to get it today but would like to read up on it and potentially pursue it. Patient was encouraged to wear seatbelt and take appropriate health maintenance precautions. Migraines-they have been improving encourage patient to use migraine diary. Imitrex refilled. If any changes occur patient to return and we will order imaging and change medications. Of note in the past he has been on prophylaxis but got off of it because he did not want to be dependent on medications forever. Patient encouraged to notify us of any changes occur. History of Hodgkin's disease-check [facility]  No follow-ups on file.     Orders Placed This Encounter   Procedures    Chlamydia/GC DNA, Urine     Standing Status:   Future     Standing Expiration Date:   5/27/2022    Hepatitis C Antibody     Standing Status:   Future     Standing Expiration Date:   5/27/2022    CBC Auto Differential     Standing Status:   Future     Standing Expiration Date:   5/27/2022    Comprehensive Metabolic Panel, Fasting     Standing Status:   Future     Standing Expiration Date:   5/27/2022    Lipid, Fasting     Standing Status:   Future     Standing Expiration Date:   5/27/2022    Lactate Dehydrogenase     Standing Status:   Future     Standing Expiration Date:   5/27/2022    TSH With Reflex Ft4     Standing Status:   Future     Standing Expiration Date:   5/27/2022    Vitamin D 25 Hydroxy     Standing Status:   Future     Standing Expiration Date:   5/27/2022    HIV Screen     Standing Status:   Future     Standing Expiration Date:   5/27/2022     Orders Placed This Encounter   Medications    SUMAtriptan (IMITREX) 100 MG tablet     Sig: Take one tablet at onset of migraine, may repeat dose x 1 in 2 hours if no relief     Dispense:  27 tablet     Refill:  1       Patient given educational materials - see patient instructions. Discussed use, benefit, and side effects of prescribed medications. All patientquestions answered. Pt voiced understanding. Reviewed health maintenance. Instructedto continue current medications, diet and exercise. Patient agreed with treatmentplan. Follow up as directed.      Electronically signed by Pham Dougherty MD on 5/27/2021 at 2:03 PM

## 2021-05-27 NOTE — PATIENT INSTRUCTIONS
Patient Education        HPV (Human Papillomavirus) Vaccine Gardasil®: What You Need to Know  What is HPV? Genital human papillomavirus (HPV) is the most common sexually transmitted virus in the [place]. More than half of sexually active men and women are infected with HPV at some time in their lives. About 20 million Americans are currently infected, and about 6 million more get infected each year. HPV is usually spread through sexual contact. Most HPV infections don't cause any symptoms, and go away on their own. But HPV can cause cervical cancer in women. Cervical cancer is the 2nd leading cause of cancer deaths among women around the world. In the [place], about 12,000 women get cervical cancer every year and about 4,000 are expected to die from it. HPV is also associated with several less common cancers, such as vaginal and vulvar cancers in women, and anal and oropharyngeal (back of the throat, including base of tongue and tonsils) cancers in both men and women. HPV can also cause genital warts and warts in the throat. There is no cure for HPV infection, but some of the problems it causes can be treated. HPV vaccine-Why get vaccinated? The HPV vaccine you are getting is one of two vaccines that can be given to prevent HPV. It may be given to both males and females. This vaccine can prevent most cases of cervical cancer in females, if it is given before exposure to the virus. In addition, it can prevent vaginal and vulvar cancer in females, and genital warts and anal cancer in both males and females. Protection from HPV vaccine is expected to be long-lasting. But vaccination is not a substitute for cervical cancer screening. Women should still get regular Pap tests. Who should get this HPV vaccine and when?   HPV vaccine is given as a 3-dose series  · 1st Dose: Now  · 2nd Dose: 1 to 2 months after Dose 1  · 3rd Dose: 6 months after Dose 1  Additional (booster) doses are not recommended. Routine vaccination  · This HPV vaccine is recommended for girls and boys 6or 15years of age. It may be given starting at age 5. Why is HPV vaccine recommended at 6or 15years of age? HPV infection is easily acquired, even with only one sex partner. That is why it is important to get HPV vaccine before any sexual contact takes place. Also, response to the vaccine is better at this age than at older ages. Catch-up vaccination  This vaccine is recommended for the following people who have not completed the 3-dose series:  · Females 15 through 32years of age  · Males 15 through 24years of age  This vaccine may be given to men 25 through 32years of age who have not completed the 3-dose series. It is recommended for men through age 32 who have sex with men or whose immune system is weakened because of HIV infection, other illness, or medications. HPV vaccine may be given at the same time as other vaccines. Some people should not get HPV vaccine or should wait  · Anyone who has ever had a life-threatening allergic reaction to any component of HPV vaccine, or to a previous dose of HPV vaccine, should not get the vaccine. Tell your doctor if the person getting vaccinated has any severe allergies, including an allergy to yeast.  · HPV vaccine is not recommended for pregnant women. However, receiving HPV vaccine when pregnant is not a reason to consider terminating the pregnancy. Women who are breast feeding may get the vaccine. · People who are mildly ill when a dose of HPV vaccine is planned can still be vaccinated. People with a moderate or severe illness should wait until they are better. What are the risks from this vaccine? This HPV vaccine has been used in the [place] and around the world for about six years and has been very safe. However, any medicine could possibly cause a serious problem, such as a severe allergic reaction.  The risk of any vaccine causing a serious injury, or death, is extremely small. Life-threatening allergic reactions from vaccines are very rare. If they do occur, it would be within a few minutes to a few hours after the vaccination. Several mild to moderate problems are known to occur with this HPV vaccine. These do not last long and go away on their own. · Reactions in the arm where the shot was given:  ? Pain (about 8 people in 10)  ? Redness or swelling (about 1 person in 4)  · Fever  ? Mild (100°F) (about 1 person in 10)  ? Moderate (102°F) (about 1 person in 65)  · Other problems:  ? Headache (about 1 person in 3)  · Fainting: Brief fainting spells and related symptoms (such as jerking movements) can happen after any medical procedure, including vaccination. Sitting or lying down for about 15 minutes after a vaccination can help prevent fainting and injuries caused by falls. Tell your doctor if the patient feels dizzy or light-headed, or has vision changes or ringing in the ears. Like all vaccines, HPV vaccines will continue to be monitored for unusual or severe problems. What if there is a serious reaction? What should I look for? · Look for anything that concerns you, such as signs of a severe allergic reaction, very high fever, or behavior changes. Signs of a severe allergic reaction can include hives, swelling of the face and throat, difficulty breathing, a fast heartbeat, dizziness, and weakness. These would start a few minutes to a few hours after the vaccination. What should I do? · If you think it is a severe allergic reaction or other emergency that can't wait, call 9-1-1 or get the person to the nearest hospital. Otherwise, call your doctor. · Afterward, the reaction should be reported to the Vaccine Adverse Event Reporting System (VAERS). Your doctor might file this report, or you can do it yourself through the VAERS web site at www.vaers. hhs.gov, or by calling 9-198.466.7494. VAERS is only for reporting reactions.  They do not give medical advice. The National Vaccine Injury Compensation Program  The National Vaccine Injury Compensation Program (VICP) is a federal program that was created to compensate people who may have been injured by certain vaccines. Persons who believe they may have been injured by a vaccine can learn about the program and about filing a claim by calling 6-387.874.2517 or visiting the [email or web address] website at [email or web address]. How can I learn more? · Ask your doctor. · Call your local or state health department. · Contact the Centers for Disease Control and Prevention (CDC):  ? Call 3-186.302.9550 (1-800-CDC-INFO) or  ? Visit the CDC's website at www.cdc.gov/vaccines. Vaccine Information Statement (Interim)  HPV Vaccine (Gardasil)  (5/17/2013)  [address] [id number]-[id number]  Department of Health and Human Services  Centers for Disease Control and Prevention  Many Vaccine Information Statements are available in [place] and other languages. See www.immunize.org/vis. Muchas hojas de información sobre vacunas están disponibles en español y en otros idiomas. Visite www.immunize.org/vis. Care instructions adapted under license by [facility] ([facility]). If you have questions about a medical condition or this instruction, always ask your healthcare professional. [address] any warranty or liability for your use of this information.

## 2021-05-28 DIAGNOSIS — Z13.29 THYROID DISORDER SCREENING: ICD-10-CM

## 2021-05-28 DIAGNOSIS — Z13.220 LIPID SCREENING: ICD-10-CM

## 2021-05-28 DIAGNOSIS — Z13.1 DIABETES MELLITUS SCREENING: ICD-10-CM

## 2021-05-28 LAB
ALBUMIN SERPL-MCNC: 4.6 G/DL
ALP BLD-CCNC: 57 U/L
ALT SERPL-CCNC: 30 U/L
AST SERPL-CCNC: 23 U/L
BILIRUB SERPL-MCNC: 1.1 MG/DL (ref 0.1–1.4)
BUN BLDV-MCNC: 17 MG/DL
CALCIUM SERPL-MCNC: 8.9 MG/DL
CHLORIDE BLD-SCNC: 104 MMOL/L
CHOLESTEROL, FASTING: 167
CO2: 27 MMOL/L
CREAT SERPL-MCNC: 0.98 MG/DL
GLUCOSE FASTING: 97 MG/DL
HDLC SERPL-MCNC: 4 MG/DL (ref 35–70)
LDL CHOLESTEROL CALCULATED: 108 MG/DL (ref 0–160)
POTASSIUM SERPL-SCNC: 4.3 MMOL/L
SODIUM BLD-SCNC: 139 MMOL/L
TOTAL PROTEIN: 7.2 G/DL (ref 6.4–8.2)
TRIGLYCERIDE, FASTING: 85
TSH SERPL DL<=0.05 MIU/L-ACNC: 1.97 UIU/ML

## 2021-06-01 DIAGNOSIS — Z13.21 ENCOUNTER FOR VITAMIN DEFICIENCY SCREENING: ICD-10-CM

## 2021-06-01 DIAGNOSIS — Z13.0 SCREENING FOR BLOOD DISEASE: ICD-10-CM

## 2021-06-01 DIAGNOSIS — Z11.4 ENCOUNTER FOR SCREENING FOR HIV: ICD-10-CM

## 2021-06-01 DIAGNOSIS — Z11.3 ROUTINE SCREENING FOR STI (SEXUALLY TRANSMITTED INFECTION): ICD-10-CM

## 2021-06-01 DIAGNOSIS — Z11.59 NEED FOR HEPATITIS C SCREENING TEST: ICD-10-CM

## 2021-06-01 LAB
ANTIBODY: 0.1
BASOPHILS ABSOLUTE: 0.1 /ΜL
BASOPHILS RELATIVE PERCENT: 1 %
EOSINOPHILS ABSOLUTE: 0.2 /ΜL
EOSINOPHILS RELATIVE PERCENT: 4 %
HCT VFR BLD CALC: 41.3 % (ref 41–53)
HEMOGLOBIN: 14.3 G/DL (ref 13.5–17.5)
HIV AG/AB: NORMAL
LYMPHOCYTES ABSOLUTE: 1.5 /ΜL
LYMPHOCYTES RELATIVE PERCENT: 35 %
MCH RBC QN AUTO: 29.4 PG
MCHC RBC AUTO-ENTMCNC: 34.6 G/DL
MCV RBC AUTO: 85 FL
MONOCYTES ABSOLUTE: 0.4 /ΜL
MONOCYTES RELATIVE PERCENT: 10 %
NEUTROPHILS ABSOLUTE: 2.2 /ΜL
NEUTROPHILS RELATIVE PERCENT: 50 %
PDW BLD-RTO: 13.2 %
PLATELET # BLD: 166 K/ΜL
PMV BLD AUTO: 9.3 FL
RBC # BLD: 4.87 10^6/ΜL
VITAMIN D 25-HYDROXY: 16.3
VITAMIN D2, 25 HYDROXY: NORMAL
VITAMIN D3,25 HYDROXY: NORMAL
WBC # BLD: 4.3 10^3/ML

## 2021-06-02 RX ORDER — ERGOCALCIFEROL 1.25 MG/1
50000 CAPSULE ORAL WEEKLY
Qty: 12 CAPSULE | Refills: 1 | Status: SHIPPED | OUTPATIENT
Start: 2021-06-02 | End: 2022-05-03

## 2021-09-30 DIAGNOSIS — G43.709 CHRONIC MIGRAINE WITHOUT AURA WITHOUT STATUS MIGRAINOSUS, NOT INTRACTABLE: ICD-10-CM

## 2021-09-30 RX ORDER — SUMATRIPTAN 100 MG/1
TABLET, FILM COATED ORAL
Qty: 27 TABLET | Refills: 1 | Status: SHIPPED | OUTPATIENT
Start: 2021-09-30 | End: 2022-05-03 | Stop reason: SDUPTHER

## 2021-09-30 NOTE — TELEPHONE ENCOUNTER
Next Visit Date:  No future appointments.     Health Maintenance   Topic Date Due    HPV vaccine (1 - Male 2-dose series) Never done    COVID-19 Vaccine (1) Never done    Varicella vaccine (2 of 2 - 13+ 2-dose series) 12/20/2011    Flu vaccine (1) Never done    DTaP/Tdap/Td vaccine (7 - Td or Tdap) 12/09/2030    Hepatitis B vaccine  Completed    Hib vaccine  Completed    Hepatitis C screen  Completed    HIV screen  Completed    Hepatitis A vaccine  Aged Out    Meningococcal (ACWY) vaccine  Aged Out    Pneumococcal 0-64 years Vaccine  Aged Out       No results found for: LABA1C          ( goal A1C is < 7)   No results found for: LABMICR  LDL Calculated (mg/dL)   Date Value   05/28/2021 108       (goal LDL is <100)   AST (U/L)   Date Value   05/28/2021 23     ALT (U/L)   Date Value   05/28/2021 30     BUN (mg/dL)   Date Value   05/28/2021 17     BP Readings from Last 3 Encounters:   05/27/21 130/89   11/16/20 120/74   06/03/19 117/71          (goal 120/80)    All Future Testing planned in [facility]  Lab Frequency Next Occurrence   Lactate Dehydrogenase Once 05/27/2021   Vitamin D 25 Hydroxy Once 12/02/2021               Patient Active Problem List:     Chronic migraine without aura without status migrainosus, not intractable

## 2022-05-03 ENCOUNTER — TELEMEDICINE (OUTPATIENT)
Dept: FAMILY MEDICINE CLINIC | Age: 27
End: 2022-05-03
Payer: COMMERCIAL

## 2022-05-03 DIAGNOSIS — E55.9 VITAMIN D DEFICIENCY: ICD-10-CM

## 2022-05-03 DIAGNOSIS — E78.6 LOW HDL (UNDER 40): ICD-10-CM

## 2022-05-03 DIAGNOSIS — G43.709 CHRONIC MIGRAINE WITHOUT AURA WITHOUT STATUS MIGRAINOSUS, NOT INTRACTABLE: Primary | ICD-10-CM

## 2022-05-03 PROBLEM — Z85.71 HISTORY OF HODGKIN'S DISEASE: Status: ACTIVE | Noted: 2022-05-03

## 2022-05-03 PROCEDURE — 99213 OFFICE O/P EST LOW 20 MIN: CPT | Performed by: STUDENT IN AN ORGANIZED HEALTH CARE EDUCATION/TRAINING PROGRAM

## 2022-05-03 RX ORDER — SUMATRIPTAN 100 MG/1
TABLET, FILM COATED ORAL
Qty: 27 TABLET | Refills: 1 | Status: SHIPPED | OUTPATIENT
Start: 2022-05-03 | End: 2022-08-30 | Stop reason: SDUPTHER

## 2022-05-03 SDOH — ECONOMIC STABILITY: FOOD INSECURITY: WITHIN THE PAST 12 MONTHS, YOU WORRIED THAT YOUR FOOD WOULD RUN OUT BEFORE YOU GOT MONEY TO BUY MORE.: NEVER TRUE

## 2022-05-03 SDOH — ECONOMIC STABILITY: FOOD INSECURITY: WITHIN THE PAST 12 MONTHS, THE FOOD YOU BOUGHT JUST DIDN'T LAST AND YOU DIDN'T HAVE MONEY TO GET MORE.: NEVER TRUE

## 2022-05-03 ASSESSMENT — ENCOUNTER SYMPTOMS
WHEEZING: 0
NAUSEA: 0
VOMITING: 0
CONSTIPATION: 0
SHORTNESS OF BREATH: 0
EYE DISCHARGE: 0
CHEST TIGHTNESS: 0
COUGH: 0
SORE THROAT: 0
DIARRHEA: 0
ABDOMINAL PAIN: 0

## 2022-05-03 ASSESSMENT — SOCIAL DETERMINANTS OF HEALTH (SDOH): HOW HARD IS IT FOR YOU TO PAY FOR THE VERY BASICS LIKE FOOD, HOUSING, MEDICAL CARE, AND HEATING?: NOT HARD AT ALL

## 2022-05-03 NOTE — PROGRESS NOTES
5/3/2022    TELEHEALTH EVALUATION -- Audio/Visual (During [id number] public health emergency)    HPI:    Luke Benjamin (:  1995) has requested an audio/video evaluation for the following concern(s):    He is following up on his chronic migraines. He says that he takes Imitrex 100 mg as needed for his headaches and that has been working well for him. He gets headaches about 2-3 times a month. He also has been having increased anxiety and difficulty sleeping at night for past few months because of increased stress but says that he has been able to deal with it. He does not want to start on any medications. He also lost his dad 2 years ago around this time and says that has also been adding to his anxiety issues. He has a history of Hodgkin's lymphoma which has been in remission for past 6 to 7 years. He has a history of vitamin D deficiency and is no longer taking vitamin D supplementation. His HDL was also very low at last labs. Denies any family history of heart disease. Review of Systems   Constitutional: Negative for appetite change, fatigue and fever. HENT: Negative for congestion, ear pain, hearing loss and sore throat. Eyes: Negative for discharge and visual disturbance. Respiratory: Negative for cough, chest tightness, shortness of breath and wheezing. Cardiovascular: Negative for chest pain, palpitations and leg swelling. Gastrointestinal: Negative for abdominal pain, constipation, diarrhea, nausea and vomiting. Genitourinary: Negative for flank pain, frequency, hematuria and urgency. Musculoskeletal: Negative for arthralgias, gait problem, joint swelling and myalgias. Skin: Negative. Neurological: Positive for headaches. Negative for dizziness, weakness and numbness. Psychiatric/Behavioral: Positive for sleep disturbance. Negative for dysphoric mood. The patient is nervous/anxious. Prior to Visit Medications    Medication Sig Taking?  Authorizing Provider SUMAtriptan (IMITREX) 100 MG tablet take 1 tablet by mouth AT ONSET OF HEADACHE may repeat in 2 hours IF headache PERSISTS maximum daily dose of 2 tablets ( 200 milligrams ) every 24 hours Yes Kimberly Noe MD       Social History     Tobacco Use    Smoking status: Never Smoker    Smokeless tobacco: Never Used   Vaping Use    Vaping Use: Never used   Substance Use Topics    Alcohol use: Yes     Alcohol/week: 12.0 standard drinks     Types: 6 Cans of beer, 6 Shots of liquor per week    Drug use: Never        Allergies   Allergen Reactions    Sulfa Antibiotics Hives   ,   Past Medical History:   Diagnosis Date    Headache     Hodgkin's lymphoma ([facility] [address] 2012   ,   Past Surgical History:   Procedure Laterality Date    FOOT SURGERY      2 years ago  1 plate 6 screws     NECK SURGERY      lymph node removal and biopsy    TYMPANOSTOMY TUBE PLACEMENT     ,   Social History     Tobacco Use    Smoking status: Never Smoker    Smokeless tobacco: Never Used   Vaping Use    Vaping Use: Never used   Substance Use Topics    Alcohol use:  Yes     Alcohol/week: 12.0 standard drinks     Types: 6 Cans of beer, 6 Shots of liquor per week    Drug use: Never   ,   Family History   Problem Relation Age of Onset    Other Mother         migraines    Migraines Mother     Migraines Maternal Grandmother     Brain Cancer Maternal Grandfather         brain    Cancer Paternal Grandmother         jaw cancer    No Known Problems Paternal Grandfather     Breast Cancer Neg Hx     Ovarian Cancer Neg Hx     Prostate Cancer Neg Hx     Colon Cancer Neg Hx     Heart Disease Neg Hx    ,   Immunization History   Administered Date(s) Administered    DTaP 1995, 1995, 1995, 04/11/2002, 03/07/2007    DTaP vaccine 1995, 1995, 1995, 04/11/2002, 03/07/2007    Hepatitis B Ped/Adol (Engerix-B, Recombivax HB) 08/22/2000, 11/02/2000, 10/07/2003    Hib PRP-OMP (PedvaxHIB) 1995, 1995, 03/20/1997    MMR 1995, 06/28/1996, 07/06/2000    Polio IPV (IPOL) 1995, 1995, 1995, 03/20/1997, 04/11/2002    Tdap (Boostrix, Adacel) 12/09/2020    Varicella (Varivax) 11/22/2011   ,   Health Maintenance   Topic Date Due    COVID-19 Vaccine (1) Never done    Varicella vaccine (2 of 2 - 13+ 2-dose series) 12/20/2011    Depression Screen  05/27/2022    Flu vaccine (Season Ended) 09/01/2022    DTaP/Tdap/Td vaccine (7 - Td or Tdap) 12/09/2030    Hepatitis B vaccine  Completed    Hib vaccine  Completed    Hepatitis C screen  Completed    HIV screen  Completed    Hepatitis A vaccine  Aged Out    Meningococcal (ACWY) vaccine  Aged Out    Pneumococcal 0-64 years Vaccine  Aged Out       PHYSICAL EXAMINATION:  [ INSTRUCTIONS:  \"[x]\" Indicates a positive item  \"[]\" Indicates a negative item  -- DELETE ALL ITEMS NOT EXAMINED]  Vital Signs: (As obtained by patient/caregiver or practitioner observation)    Blood pressure-  Heart rate-    Respiratory rate-    Temperature-  Pulse oximetry-     Constitutional: [x] Appears well-developed and well-nourished [x] No apparent distress      [] Abnormal-   Mental status  [x] Alert and awake  [x] Oriented to person/place/time [x]Able to follow commands      Eyes:  EOM    [x]  Normal  [] Abnormal-  Sclera  [x]  Normal  [] Abnormal -         Discharge [x]  None visible  [] Abnormal -    HENT:   [x] Normocephalic, atraumatic.   [] Abnormal   [x] Mouth/Throat: Mucous membranes are moist.     External Ears [x] Normal  [] Abnormal-     Neck: [x] No visualized mass     Pulmonary/Chest: [x] Respiratory effort normal.  [x] No visualized signs of difficulty breathing or respiratory distress        [] Abnormal-      Musculoskeletal:   [] Normal gait with no signs of ataxia         [x] Normal range of motion of neck        [] Abnormal-       Neurological:        [x] No Facial Asymmetry (Cranial nerve 7 motor function) (limited exam to video visit)          [x] No gaze palsy        [] Abnormal-         Skin:        [x] No significant exanthematous lesions or discoloration noted on facial skin         [] Abnormal-            Psychiatric:       [x] Normal Affect [x] No Hallucinations        [] Abnormal-     Other pertinent observable physical exam findings-     ASSESSMENT/PLAN:  1. Chronic migraine without aura without status migrainosus, not intractable    - SUMAtriptan (IMITREX) 100 MG tablet; take 1 tablet by mouth AT ONSET OF HEADACHE may repeat in 2 hours IF headache PERSISTS maximum daily dose of 2 tablets ( 200 milligrams ) every 24 hours  Dispense: 27 tablet; Refill: 1    2. Low HDL (under 40)    - Lipid, Fasting; Future    3. Vitamin D deficiency    - Vitamin D 25 Hydroxy; Future      Return in about 1 year (around 5/3/2023) for annual.    Elicia Bray, was evaluated through a synchronous (real-time) audio-video encounter. The patient (or guardian if applicable) is aware that this is a billable service, which includes applicable co-pays. This Virtual Visit was conducted with patient's (and/or legal guardian's) consent. The visit was conducted pursuant to the emergency declaration under the [address] [place] [address] authority and the [employer] General Act. Patient identification was verified, and a caregiver was present when appropriate. The patient was located at home in a state where the provider was licensed to provide care. Total time spent on this encounter: Not billed by time    --Mel Bergeron MD on 5/3/2022 at 5:19 PM    An electronic signature was used to authenticate this note.

## 2022-06-02 DIAGNOSIS — Z85.71 HISTORY OF HODGKIN'S DISEASE: Primary | ICD-10-CM

## 2022-07-05 DIAGNOSIS — Z85.71 HISTORY OF HODGKIN'S DISEASE: ICD-10-CM

## 2022-08-25 ENCOUNTER — TELEPHONE (OUTPATIENT)
Dept: FAMILY MEDICINE CLINIC | Age: 27
End: 2022-08-25

## 2022-08-30 ENCOUNTER — OFFICE VISIT (OUTPATIENT)
Dept: FAMILY MEDICINE CLINIC | Age: 27
End: 2022-08-30
Payer: COMMERCIAL

## 2022-08-30 VITALS
HEART RATE: 59 BPM | HEIGHT: 71 IN | BODY MASS INDEX: 27.86 KG/M2 | WEIGHT: 199 LBS | DIASTOLIC BLOOD PRESSURE: 84 MMHG | TEMPERATURE: 97.8 F | SYSTOLIC BLOOD PRESSURE: 145 MMHG

## 2022-08-30 DIAGNOSIS — G43.709 CHRONIC MIGRAINE WITHOUT AURA WITHOUT STATUS MIGRAINOSUS, NOT INTRACTABLE: ICD-10-CM

## 2022-08-30 DIAGNOSIS — R22.2 NODULE OF SKIN OF BACK: Primary | ICD-10-CM

## 2022-08-30 PROCEDURE — 99214 OFFICE O/P EST MOD 30 MIN: CPT

## 2022-08-30 RX ORDER — SUMATRIPTAN 100 MG/1
TABLET, FILM COATED ORAL
Qty: 27 TABLET | Refills: 1 | Status: SHIPPED | OUTPATIENT
Start: 2022-08-30

## 2022-08-30 ASSESSMENT — ENCOUNTER SYMPTOMS
SINUS PRESSURE: 0
EYE REDNESS: 0
EYE ITCHING: 0
SORE THROAT: 0
VOMITING: 0
ABDOMINAL PAIN: 0
DIARRHEA: 0
SHORTNESS OF BREATH: 0
TROUBLE SWALLOWING: 0
EYE DISCHARGE: 0
NAUSEA: 0
COUGH: 0
SINUS PAIN: 0
CHEST TIGHTNESS: 0
WHEEZING: 0

## 2022-08-30 ASSESSMENT — PATIENT HEALTH QUESTIONNAIRE - PHQ9
SUM OF ALL RESPONSES TO PHQ QUESTIONS 1-9: 0
SUM OF ALL RESPONSES TO PHQ9 QUESTIONS 1 & 2: 0
2. FEELING DOWN, DEPRESSED OR HOPELESS: 0
1. LITTLE INTEREST OR PLEASURE IN DOING THINGS: 0

## 2022-08-30 NOTE — PATIENT INSTRUCTIONS
New Updates for My [facility]    Thank you for choosing US to give you the best care! [facility] ([facility]) is always trying to think of new ways to help their patients. We are asking all patients to try out the new digital registration that is now available through the new [facility]K, feel free to download today. Via the janak you're now able to update your personal and registration information prior to your upcoming appointment. This will save you time once you arrive at the office to check-in, not to mention your information remains safe!! Many other perks come from signing up for an account, such as:  Requesting refills  Scheduling an appointment  Completing an E-Visit  Sending a message to the office/provider  Having access to your medication list  Paying your bill/copay prior to your appointment  Scheduling your yearly mammogram  Review your test results    If you are not familiar with the [facility]K, please ask one of us and we will be happy to answer any questions or help you set-up your account.

## 2022-08-30 NOTE — PROGRESS NOTES
[address] [facility],6Th [address]  [address] [facility]  [address]  8/30/2022    Lion Jj is a 32 y.o. male who presents today for his medical conditions and/or complaints as noted below. Lion Jj is scheduled today for Other (Lump on back x 2 months, no pain /)  . HPI:     This is a 80-year-old male presenting to the office to be evaluated for a lump on his low back. Patient states proximately 4 to 5 months ago he first noticed a firm, nontender, slightly mobile mass to his lower left back. Patient has a history of nodular lymphocyte predominant Hodgkin's lymphoma. Patient and his mother are concerned of what this new nodule may mean. They are both anxious while in the room. Patient denies any tenderness to the area. He denies any known injury or trauma to that area previous to the onset. Vitals:    08/30/22 1546 08/30/22 1615   BP: (!) 144/84 (!) 145/84   Site: Right Upper Arm Right Upper Arm   Position: Sitting Sitting   Cuff Size: Medium Adult Medium Adult   Pulse: 66 59   Temp: 97.8 °F (36.6 °C)    TempSrc: Temporal    Weight: 199 lb (90.3 kg)    Height: 5' 11\" (1.803 m)       Past Medical History:   Diagnosis Date    Headache     Hodgkin's lymphoma ([facility] [address] 2012      Past Surgical History:   Procedure Laterality Date    FOOT SURGERY      2 years ago  1 plate 6 screws     NECK SURGERY      lymph node removal and biopsy    TYMPANOSTOMY TUBE PLACEMENT       Family History   Problem Relation Age of Onset    Other Mother         migraines    Migraines Mother     Migraines Maternal Grandmother     Brain Cancer Maternal Grandfather         brain    Cancer Paternal Grandmother         jaw cancer    No Known Problems Paternal Grandfather     Breast Cancer Neg Hx     Ovarian Cancer Neg Hx     Prostate Cancer Neg Hx     Colon Cancer Neg Hx     Heart Disease Neg Hx      Social History     Tobacco Use    Smoking status: Never    Smokeless tobacco: Never   Substance Use Topics    Alcohol use:  Yes Alcohol/week: 12.0 standard drinks     Types: 6 Cans of beer, 6 Shots of liquor per week      Current Outpatient Medications   Medication Sig Dispense Refill    SUMAtriptan (IMITREX) 100 MG tablet take 1 tablet by mouth AT ONSET OF HEADACHE may repeat in 2 hours IF headache PERSISTS maximum daily dose of 2 tablets ( 200 milligrams ) every 24 hours 27 tablet 1     No current facility-administered medications for this visit. Allergies   Allergen Reactions    Sulfa Antibiotics Hives       Health Maintenance   Topic Date Due    COVID-19 Vaccine (1) Never done    Varicella vaccine (2 of 2 - 13+ 2-dose series) 12/20/2011    Depression Screen  05/27/2022    Flu vaccine (1) 09/01/2022    DTaP/Tdap/Td vaccine (7 - Td or Tdap) 12/09/2030    Hepatitis B vaccine  Completed    Hib vaccine  Completed    Hepatitis C screen  Completed    HIV screen  Completed    Hepatitis A vaccine  Aged Out    Meningococcal (ACWY) vaccine  Aged Out    Pneumococcal 0-64 years Vaccine  Aged Out       Subjective:      Review of Systems   Constitutional:  Negative for chills, fatigue and fever. HENT:  Negative for ear discharge, ear pain, sinus pressure, sinus pain, sore throat and trouble swallowing. Eyes:  Negative for discharge, redness and itching. Respiratory:  Negative for cough, chest tightness, shortness of breath and wheezing. Cardiovascular:  Negative for chest pain. Gastrointestinal:  Negative for abdominal pain, diarrhea, nausea and vomiting. Genitourinary:  Negative for difficulty urinating. Musculoskeletal:  Negative for arthralgias and neck pain. Skin:  Negative for rash. Firm nodule low back left side   Neurological:  Negative for dizziness, weakness, light-headedness and headaches. All other systems reviewed and are negative. Objective:     Physical Exam  Vitals reviewed. Constitutional:       General: He is not in acute distress. Appearance: Normal appearance. He is normal weight.  He is not ill-appearing. HENT:      Head: Normocephalic and atraumatic. Nose: Nose normal.   Eyes:      Extraocular Movements: Extraocular movements intact. Conjunctiva/sclera: Conjunctivae normal.      Pupils: Pupils are equal, round, and reactive to light. Cardiovascular:      Rate and Rhythm: Normal rate and regular rhythm. Pulses: Normal pulses. Heart sounds: Normal heart sounds. No murmur heard. Pulmonary:      Effort: Pulmonary effort is normal. No respiratory distress. Breath sounds: Normal breath sounds. Abdominal:      General: Abdomen is flat. Palpations: Abdomen is soft. Musculoskeletal:         General: Normal range of motion. Cervical back: Neck supple. Back:    Skin:     General: Skin is warm and dry. Capillary Refill: Capillary refill takes less than 2 seconds. Neurological:      General: No focal deficit present. Mental Status: He is alert and oriented to person, place, and time. Psychiatric:         Mood and Affect: Mood normal.        Assessment/Plan:      1. Nodule of skin of back  -     US ABDOMEN LIMITED; Future  2. Chronic migraine without aura without status migrainosus, not intractable  -     SUMAtriptan (IMITREX) 100 MG tablet; take 1 tablet by mouth AT ONSET OF HEADACHE may repeat in 2 hours IF headache PERSISTS maximum daily dose of 2 tablets ( 200 milligrams ) every 24 hours, Disp-27 tablet, R-1Normal     Back nodule-  With the patient's history of Hodgkin's disease, I would like the patient to be evaluated by ultrasound for further investigation of the cause of this nodule. Migraine-  Patient states he is stable on his current dose of Imitrex. He states that it is greatly beneficial in aborting migraines that he has. We will continue him on this medication. Return if symptoms worsen or fail to improve. Orders Placed This Encounter   Procedures    US ABDOMEN LIMITED     This procedure can be scheduled via [email or web address].   Access your [employer] account by visiting [email or web address]. Standing Status:   Future     Standing Expiration Date:   8/30/2023     Order Specific Question:   Reason for exam:     Answer:   5 month hx of firm nodule on left low back. hx of nodudular predominant hodgkins lymphoma     Orders Placed This Encounter   Medications    SUMAtriptan (IMITREX) 100 MG tablet     Sig: take 1 tablet by mouth AT ONSET OF HEADACHE may repeat in 2 hours IF headache PERSISTS maximum daily dose of 2 tablets ( 200 milligrams ) every 24 hours     Dispense:  27 tablet     Refill:  1       Reviewed health maintenance, prior labs and imaging. Patient given educational materials - see patient instructions. Discussed use, benefit, and side effects of prescribed medications. Barriers to medication compliance addressed. All patient questions answered. Pt voiced understanding to plan of care. Instructed to continue medications as discussed, healthy diet and exercise. Patient agreed with treatment plan. Follow up as directed below. This note is created with the assistance of a speech-recognition program. While intending to generate a document that actually reflects the content of the visit, no guarantees can be provided that every mistake has been identified and corrected by editing.     Electronically signed by RAYMOND Green CNP, APRN-CNP on 8/30/2022 at 4:23 PM

## 2022-09-19 ENCOUNTER — HOSPITAL ENCOUNTER (OUTPATIENT)
Dept: ULTRASOUND IMAGING | Facility: CLINIC | Age: 27
Discharge: HOME | End: 2022-09-21
Payer: COMMERCIAL

## 2022-09-19 DIAGNOSIS — R22.2 NODULE OF SKIN OF BACK: ICD-10-CM

## 2022-09-19 PROCEDURE — 76705 ECHO EXAM OF ABDOMEN: CPT

## 2022-09-21 ENCOUNTER — TELEPHONE (OUTPATIENT)
Dept: SURGERY | Age: 27
End: 2022-09-21

## 2022-09-21 DIAGNOSIS — R22.2 NODULE OF SKIN OF BACK: Primary | ICD-10-CM

## 2022-10-10 NOTE — H&P (VIEW-ONLY)
[address] [facility]  [address]  [address] [address]  Dept: [phone]                 Clinic History and Physical    Patient:  Shonna Lopez  MRN: [id number]    CHIEF COMPLAINT:  had concerns including New Patient (Referral for Nodule of skin on back, hx of hodgkin's). PCP: Geovanna Shipman MD  Referring Physician: Laurie Tovar MD    HISTORY OF PRESENT ILLNESS:   The patient is a 32 y.o. male with a history of Hodgkin's lymphoma in the neck cured with radiation in 2012. He is BRISA since then. He comes with his mother and girlfriend Snow. He recently noticed a nodule in the left lower back. It is not bothering him much but is giving him a lot of anxiety given his history of lymphoma. He underwent ultrasound which demonstrated a well-defined solid heterogeneous nonvascular lesion measuring 1.8 x 1.3 x 2.6 cm in the left lower back. Differential is soft tissue tumor versus sebaceous cyst.  He is pretty active and works as a . Past Medical History:        Diagnosis Date    Headache     Hodgkin's lymphoma ([address] 2012       Past Surgical History:        Procedure Laterality Date    FOOT SURGERY      2 years ago  1 plate 6 screws     NECK SURGERY      lymph node removal and biopsy    TYMPANOSTOMY TUBE PLACEMENT         Medications Prior to Admission:    Prior to Admission medications    Medication Sig Start Date End Date Taking? Authorizing Provider   SUMAtriptan (IMITREX) 100 MG tablet take 1 tablet by mouth AT ONSET OF HEADACHE may repeat in 2 hours IF headache PERSISTS maximum daily dose of 2 tablets ( 200 milligrams ) every 24 hours 8/30/22  Yes Rachel Maloney, APRN - CNP       Allergies:  Sulfa antibiotics    Social History:   TOBACCO:   reports that he has never smoked. He has never used smokeless tobacco.  ETOH:   reports current alcohol use of about 12.0 standard drinks per week.   OCCUPATION: Family History:       Problem Relation Age of Onset    Other Mother         migraines    Migraines Mother     Migraines Maternal Grandmother     Brain Cancer Maternal Grandfather         brain    Cancer Paternal Grandmother         jaw cancer    No Known Problems Paternal Grandfather     Breast Cancer Neg Hx     Ovarian Cancer Neg Hx     Prostate Cancer Neg Hx     Colon Cancer Neg Hx     Heart Disease Neg Hx        REVIEW OF SYSTEMS:  Review of Systems   Constitutional:  Negative for chills, fatigue, fever and unexpected weight change. Eyes:  Negative for visual disturbance. Respiratory:  Negative for cough, chest tightness, shortness of breath and wheezing. Cardiovascular:  Negative for chest pain, palpitations and leg swelling. Gastrointestinal:  Negative for abdominal distention, abdominal pain, blood in stool, constipation, diarrhea, nausea and vomiting. Genitourinary:  Negative for dysuria, hematuria and urgency. Musculoskeletal:  Negative for back pain. Skin:  Negative for rash. Neurological:  Negative for dizziness, seizures, syncope, speech difficulty, weakness, light-headedness, numbness and headaches. Hematological:  Negative for adenopathy. Does not bruise/bleed easily. Psychiatric/Behavioral:  The patient is not nervous/anxious. Physical Exam:    Vitals: BP (!) 156/93 (Site: Right Upper Arm, Position: Sitting, Cuff Size: Medium Adult)   Pulse 79   Ht 5' 11\" (1.803 m)   Wt 204 lb 9.6 oz (92.8 kg)   BMI 28.54 kg/m²   General appearance: alert, appears stated age and cooperative  Skin: Skin color, texture, turgor normal. No rashes or lesions  HEENT: Head: Normocephalic, no lesions, without obvious abnormality.   Neck: no adenopathy, no carotid bruit, no JVD, supple, symmetrical, trachea midline, and thyroid not enlarged, symmetric, no tenderness/mass/nodules  Lungs: Breathing comfortably  Heart: RRR  Back: left lower back nodule, no lymphadenopathy  Extremities: extremities normal, atraumatic, no cyanosis or edema  Neurologic: Mental status: Alert, oriented, thought content appropriate    CBC:   Lab Results   Component Value Date    WBC 4.3 05/28/2021    HGB 14.3 05/28/2021    HCT 41.3 05/28/2021    MCV 85 05/28/2021     05/28/2021     BMP:    Lab Results   Component Value Date/Time     05/28/2021 12:00 AM    K 4.3 05/28/2021 12:00 AM     05/28/2021 12:00 AM    CO2 27 05/28/2021 12:00 AM    BUN 17 05/28/2021 12:00 AM    CREATININE 0.98 05/28/2021 12:00 AM    CALCIUM 8.9 05/28/2021 12:00 AM      Hepatic:   Lab Results   Component Value Date/Time    ALKPHOS 57 05/28/2021 12:00 AM    ALT 30 05/28/2021 12:00 AM    AST 23 05/28/2021 12:00 AM    PROT 7.2 05/28/2021 12:00 AM    BILITOT 1.1 05/28/2021 12:00 AM    LABALBU 4.6 05/28/2021 12:00 AM      Imaging   Ultrasound Result (most recent):  US ABDOMEN LIMITED 09/19/2022    Narrative  EXAMINATION:  Limited ultrasound of the abdomen    9/19/2022 1:58 pm    COMPARISON:  None. HISTORY:  ORDERING SYSTEM PROVIDED HISTORY: Nodule of skin of back  TECHNOLOGIST PROVIDED HISTORY:  This procedure can be scheduled via [employer]. Access your [employer] account by  visiting [email or web address].  5 month hx of firm nodule on left low back. hx of nodudular predominant  hodgkins lymphoma  Specify organ?->    FINDINGS:  Along the left posterior dorsum, there is a well-defined solid heterogeneous  nonvascular subcutaneous lesion. It measures 1.8 x 1.3 x 2.6 cm. Portion of  the lesion appear to extend through the dermis. Possibilities include a soft  tissue tumor or sebaceous cyst.    Impression  Along the left posterior dorsum, there is a well-defined solid heterogeneous  nonvascular lesion. It measures 1.8 x 1.3 x 2.6 cm. Portions of the lesion  appear to extend through the dermis. Possibilities include a soft tissue  tumor or sebaceous cyst.  Consideration for tissue sampling should be given.     The findings were sent to the Radiology Results Communication Center at 11:03  am on 9/20/2022 to be communicated to a licensed caregiver. Assessment and Plan   Left lower back nodule  Is a 51-year-old male who presented with small left lower back nodule. He has a history of Hodgkin's lymphoma that was treated 10 years ago. I have reviewed the ultrasound. We discussed possible biopsy followed by surgical management. However the patient is willing to undergo resection regardless of the biopsy resolved. I think this is reasonable as the biopsy might have high false-negative rate. In this way we also have a better assessment and pathological examination of the lesion. We will schedule him for excisional biopsy of left lower back lesion    2. History of neck radiation  We discussed the need for thyroid ultrasound given the history of radiation. He would like to follow that with his primary care physician. Patient Active Problem List   Diagnosis Code    Chronic migraine without aura without status migrainosus, not intractable G43.709    History of Hodgkin's disease Z85.71    Low HDL (under 40) E78.6    Vitamin D deficiency E55.9       Ashley Hoffmann was seen today for new patient. Diagnoses and all orders for this visit:    History of Hodgkin's disease    Chronic migraine without aura without status migrainosus, not intractable    Mass of skin of back       Amanda Cano MD [facility]  Surgical Oncology/HPB Surgery  [facility] [facility]  [address]  [place] [address]  Office:  [phone]  Fax:  [phone]  10/11/22   3:33 PM    CC: Joanna Zaldivar MD  CC: Nona Whiting MD

## 2022-10-10 NOTE — PROGRESS NOTES
[address] [facility]  [address]  [address] [address]  Dept: [phone]                 Clinic History and Physical    Patient:  Ritchie Sim  MRN: [id number]    CHIEF COMPLAINT:  had concerns including New Patient (Referral for Nodule of skin on back, hx of hodgkin's). PCP: Maria Del Carmen Funes MD  Referring Physician: Goyo Solis MD    HISTORY OF PRESENT ILLNESS:   The patient is a 32 y.o. male with a history of Hodgkin's lymphoma in the neck cured with radiation in 2012. He is BRISA since then. He comes with his mother and girlfriend Snow. He recently noticed a nodule in the left lower back. It is not bothering him much but is giving him a lot of anxiety given his history of lymphoma. He underwent ultrasound which demonstrated a well-defined solid heterogeneous nonvascular lesion measuring 1.8 x 1.3 x 2.6 cm in the left lower back. Differential is soft tissue tumor versus sebaceous cyst.  He is pretty active and works as a . Past Medical History:        Diagnosis Date    Headache     Hodgkin's lymphoma ([facility] [address] 2012       Past Surgical History:        Procedure Laterality Date    FOOT SURGERY      2 years ago  1 plate 6 screws     NECK SURGERY      lymph node removal and biopsy    TYMPANOSTOMY TUBE PLACEMENT         Medications Prior to Admission:    Prior to Admission medications    Medication Sig Start Date End Date Taking? Authorizing Provider   SUMAtriptan (IMITREX) 100 MG tablet take 1 tablet by mouth AT ONSET OF HEADACHE may repeat in 2 hours IF headache PERSISTS maximum daily dose of 2 tablets ( 200 milligrams ) every 24 hours 8/30/22  Yes RAYMOND Rojo - CNP       Allergies:  Sulfa antibiotics    Social History:   TOBACCO:   reports that he has never smoked. He has never used smokeless tobacco.  ETOH:   reports current alcohol use of about 12.0 standard drinks per week.   OCCUPATION: Family History:       Problem Relation Age of Onset    Other Mother         migraines    Migraines Mother     Migraines Maternal Grandmother     Brain Cancer Maternal Grandfather         brain    Cancer Paternal Grandmother         jaw cancer    No Known Problems Paternal Grandfather     Breast Cancer Neg Hx     Ovarian Cancer Neg Hx     Prostate Cancer Neg Hx     Colon Cancer Neg Hx     Heart Disease Neg Hx        REVIEW OF SYSTEMS:  Review of Systems   Constitutional:  Negative for chills, fatigue, fever and unexpected weight change. Eyes:  Negative for visual disturbance. Respiratory:  Negative for cough, chest tightness, shortness of breath and wheezing. Cardiovascular:  Negative for chest pain, palpitations and leg swelling. Gastrointestinal:  Negative for abdominal distention, abdominal pain, blood in stool, constipation, diarrhea, nausea and vomiting. Genitourinary:  Negative for dysuria, hematuria and urgency. Musculoskeletal:  Negative for back pain. Skin:  Negative for rash. Neurological:  Negative for dizziness, seizures, syncope, speech difficulty, weakness, light-headedness, numbness and headaches. Hematological:  Negative for adenopathy. Does not bruise/bleed easily. Psychiatric/Behavioral:  The patient is not nervous/anxious. Physical Exam:    Vitals: BP (!) 156/93 (Site: Right Upper Arm, Position: Sitting, Cuff Size: Medium Adult)   Pulse 79   Ht 5' 11\" (1.803 m)   Wt 204 lb 9.6 oz (92.8 kg)   BMI 28.54 kg/m²   General appearance: alert, appears stated age and cooperative  Skin: Skin color, texture, turgor normal. No rashes or lesions  HEENT: Head: Normocephalic, no lesions, without obvious abnormality.   Neck: no adenopathy, no carotid bruit, no JVD, supple, symmetrical, trachea midline, and thyroid not enlarged, symmetric, no tenderness/mass/nodules  Lungs: Breathing comfortably  Heart: RRR  Back: left lower back nodule, no lymphadenopathy  Extremities: extremities normal, atraumatic, no cyanosis or edema  Neurologic: Mental status: Alert, oriented, thought content appropriate    CBC:   Lab Results   Component Value Date    WBC 4.3 05/28/2021    HGB 14.3 05/28/2021    HCT 41.3 05/28/2021    MCV 85 05/28/2021     05/28/2021     BMP:    Lab Results   Component Value Date/Time     05/28/2021 12:00 AM    K 4.3 05/28/2021 12:00 AM     05/28/2021 12:00 AM    CO2 27 05/28/2021 12:00 AM    BUN 17 05/28/2021 12:00 AM    CREATININE 0.98 05/28/2021 12:00 AM    CALCIUM 8.9 05/28/2021 12:00 AM      Hepatic:   Lab Results   Component Value Date/Time    ALKPHOS 57 05/28/2021 12:00 AM    ALT 30 05/28/2021 12:00 AM    AST 23 05/28/2021 12:00 AM    PROT 7.2 05/28/2021 12:00 AM    BILITOT 1.1 05/28/2021 12:00 AM    LABALBU 4.6 05/28/2021 12:00 AM      Imaging   Ultrasound Result (most recent):  US ABDOMEN LIMITED 09/19/2022    Narrative  EXAMINATION:  Limited ultrasound of the abdomen    9/19/2022 1:58 pm    COMPARISON:  None. HISTORY:  ORDERING SYSTEM PROVIDED HISTORY: Nodule of skin of back  TECHNOLOGIST PROVIDED HISTORY:  This procedure can be scheduled via [employer]. Access your [employer] account by  visiting [email or web address].  5 month hx of firm nodule on left low back. hx of nodudular predominant  hodgkins lymphoma  Specify organ?->    FINDINGS:  Along the left posterior dorsum, there is a well-defined solid heterogeneous  nonvascular subcutaneous lesion. It measures 1.8 x 1.3 x 2.6 cm. Portion of  the lesion appear to extend through the dermis. Possibilities include a soft  tissue tumor or sebaceous cyst.    Impression  Along the left posterior dorsum, there is a well-defined solid heterogeneous  nonvascular lesion. It measures 1.8 x 1.3 x 2.6 cm. Portions of the lesion  appear to extend through the dermis. Possibilities include a soft tissue  tumor or sebaceous cyst.  Consideration for tissue sampling should be given.     The findings were sent to the Radiology Results Communication Center at 11:03  am on 9/20/2022 to be communicated to a licensed caregiver. Assessment and Plan   Left lower back nodule  Is a 59-year-old male who presented with small left lower back nodule. He has a history of Hodgkin's lymphoma that was treated 10 years ago. I have reviewed the ultrasound. We discussed possible biopsy followed by surgical management. However the patient is willing to undergo resection regardless of the biopsy resolved. I think this is reasonable as the biopsy might have high false-negative rate. In this way we also have a better assessment and pathological examination of the lesion. We will schedule him for excisional biopsy of left lower back lesion    2. History of neck radiation  We discussed the need for thyroid ultrasound given the history of radiation. He would like to follow that with his primary care physician. Patient Active Problem List   Diagnosis Code    Chronic migraine without aura without status migrainosus, not intractable G43.709    History of Hodgkin's disease Z85.71    Low HDL (under 40) E78.6    Vitamin D deficiency E55.9       Jeanmarie Freeman was seen today for new patient. Diagnoses and all orders for this visit:    History of Hodgkin's disease    Chronic migraine without aura without status migrainosus, not intractable    Mass of skin of back       Amanda Cano MD DABS  Surgical Oncology/HPB Surgery  [facility]  [address]  [facility] [place] [address]  Office:  [phone]  Fax:  [phone]  10/11/22   3:33 PM    CC: Silvano George MD  CC: Iker Daley MD

## 2022-10-11 ENCOUNTER — OFFICE VISIT (OUTPATIENT)
Dept: SURGERY | Age: 27
End: 2022-10-11
Payer: COMMERCIAL

## 2022-10-11 ENCOUNTER — TELEPHONE (OUTPATIENT)
Dept: SURGERY | Age: 27
End: 2022-10-11

## 2022-10-11 ENCOUNTER — HOSPITAL ENCOUNTER (OUTPATIENT)
Age: 27
Discharge: HOME | End: 2022-10-11
Payer: COMMERCIAL

## 2022-10-11 VITALS
BODY MASS INDEX: 28.64 KG/M2 | DIASTOLIC BLOOD PRESSURE: 93 MMHG | WEIGHT: 204.6 LBS | SYSTOLIC BLOOD PRESSURE: 156 MMHG | HEIGHT: 71 IN | HEART RATE: 79 BPM

## 2022-10-11 DIAGNOSIS — Z85.71 HISTORY OF HODGKIN'S DISEASE: Primary | ICD-10-CM

## 2022-10-11 DIAGNOSIS — Z85.71 HISTORY OF HODGKIN'S DISEASE: ICD-10-CM

## 2022-10-11 DIAGNOSIS — G43.709 CHRONIC MIGRAINE WITHOUT AURA WITHOUT STATUS MIGRAINOSUS, NOT INTRACTABLE: ICD-10-CM

## 2022-10-11 DIAGNOSIS — R22.2 MASS OF SKIN OF BACK: ICD-10-CM

## 2022-10-11 LAB
INR BLD: 0.9
PROTHROMBIN TIME: 9.4 SEC (ref 9.4–12.6)

## 2022-10-11 PROCEDURE — 36415 COLL VENOUS BLD VENIPUNCTURE: CPT

## 2022-10-11 PROCEDURE — 83735 ASSAY OF MAGNESIUM: CPT

## 2022-10-11 PROCEDURE — 99205 OFFICE O/P NEW HI 60 MIN: CPT | Performed by: SURGERY

## 2022-10-11 PROCEDURE — 85610 PROTHROMBIN TIME: CPT

## 2022-10-11 PROCEDURE — 85025 COMPLETE CBC W/AUTO DIFF WBC: CPT

## 2022-10-11 PROCEDURE — 80053 COMPREHEN METABOLIC PANEL: CPT

## 2022-10-11 PROCEDURE — 84100 ASSAY OF PHOSPHORUS: CPT

## 2022-10-11 ASSESSMENT — ENCOUNTER SYMPTOMS
CONSTIPATION: 0
COUGH: 0
ABDOMINAL PAIN: 0
BACK PAIN: 0
SHORTNESS OF BREATH: 0
DIARRHEA: 0
CHEST TIGHTNESS: 0
ABDOMINAL DISTENTION: 0
NAUSEA: 0
BLOOD IN STOOL: 0
VOMITING: 0
WHEEZING: 0

## 2022-10-11 NOTE — TELEPHONE ENCOUNTER
Surgery Scheduled/Dr. Yanira Wayne  Excision mass LEFT back/MAC  10/21/22   PBG   9:00 am    PAT - Phone call    Patient advised in office.

## 2022-10-12 LAB
ABSOLUTE EOS #: 0.22 K/UL (ref 0–0.44)
ABSOLUTE IMMATURE GRANULOCYTE: <0.03 K/UL (ref 0–0.3)
ABSOLUTE LYMPH #: 1.92 K/UL (ref 1.1–3.7)
ABSOLUTE MONO #: 0.51 K/UL (ref 0.1–1.2)
ALBUMIN SERPL-MCNC: 4.6 G/DL (ref 3.5–5.2)
ALBUMIN/GLOBULIN RATIO: 1.8 (ref 1–2.5)
ALP BLD-CCNC: 64 U/L (ref 40–129)
ALT SERPL-CCNC: 29 U/L (ref 5–41)
ANION GAP SERPL CALCULATED.3IONS-SCNC: 13 MMOL/L (ref 9–17)
AST SERPL-CCNC: 23 U/L
BASOPHILS # BLD: 1 % (ref 0–2)
BASOPHILS ABSOLUTE: 0.06 K/UL (ref 0–0.2)
BILIRUB SERPL-MCNC: 0.3 MG/DL (ref 0.3–1.2)
BUN BLDV-MCNC: 14 MG/DL (ref 6–20)
CALCIUM SERPL-MCNC: 9 MG/DL (ref 8.6–10.4)
CHLORIDE BLD-SCNC: 103 MMOL/L (ref 98–107)
CO2: 25 MMOL/L (ref 20–31)
CREAT SERPL-MCNC: 0.93 MG/DL (ref 0.7–1.2)
EOSINOPHILS RELATIVE PERCENT: 4 % (ref 1–4)
GFR SERPL CREATININE-BSD FRML MDRD: >60 ML/MIN/1.73M2
GLUCOSE BLD-MCNC: 76 MG/DL (ref 70–99)
HCT VFR BLD CALC: 41.4 % (ref 40.7–50.3)
HEMOGLOBIN: 14 G/DL (ref 13–17)
IMMATURE GRANULOCYTES: 0 %
LYMPHOCYTES # BLD: 32 % (ref 24–43)
MAGNESIUM: 2 MG/DL (ref 1.6–2.6)
MCH RBC QN AUTO: 28.9 PG (ref 25.2–33.5)
MCHC RBC AUTO-ENTMCNC: 33.8 G/DL (ref 28.4–34.8)
MCV RBC AUTO: 85.5 FL (ref 82.6–102.9)
MONOCYTES # BLD: 9 % (ref 3–12)
NRBC AUTOMATED: 0 PER 100 WBC
PDW BLD-RTO: 12.3 % (ref 11.8–14.4)
PHOSPHORUS: 4.2 MG/DL (ref 2.5–4.5)
PLATELET # BLD: 193 K/UL (ref 138–453)
PMV BLD AUTO: 11.5 FL (ref 8.1–13.5)
POTASSIUM SERPL-SCNC: 4.1 MMOL/L (ref 3.7–5.3)
RBC # BLD: 4.84 M/UL (ref 4.21–5.77)
SEG NEUTROPHILS: 54 % (ref 36–65)
SEGMENTED NEUTROPHILS ABSOLUTE COUNT: 3.21 K/UL (ref 1.5–8.1)
SODIUM BLD-SCNC: 141 MMOL/L (ref 135–144)
TOTAL PROTEIN: 7.1 G/DL (ref 6.4–8.3)
WBC # BLD: 5.9 K/UL (ref 3.5–11.3)

## 2022-10-19 ENCOUNTER — ANESTHESIA EVENT (OUTPATIENT)
Dept: OPERATING ROOM | Age: 27
End: 2022-10-19

## 2022-10-19 NOTE — PROGRESS NOTES
Preoperative Instructions:    Stop eating solid foods at midnight the night prior to your surgery. Stop drinking clear liquids at midnight the night prior to your surgery. Arrive at the surgery center (3rd entrance) on __13-24-27_____________ by ___0700-0730am____________. Please stop any blood thinning medications as directed by your surgeon or prescribing physician. Failure to stop certain medications may interfere with your scheduled surgery. These may include: Aspirin, Coumadin, Plavix, NSAIDS (Motrin, Aleve, Advil, Mobic, Celebrex), Eliquis, Pradaxa, Xarelto, Fish oil, and herbal supplements. You may continue the rest of your medications through the night before surgery unless instructed otherwise. Day of surgery please take only the following medication(s) with a small sip of water:      Please  shower with antibacterial soap and water the morning of this surgery. Reminders:  -If you are going home the day of your procedure, you will need a family member or friend to stay during the procedure and drive you home after your procedure. Your  must be 25years of age or older and able to sign off on your discharge instructions.    -If you are going home the same day of your surgery, someone must remain with you for the first 24 hours after your surgery if you receive sedation or anesthesia.      -Please do not wear any jewelery, lotions, contacts  or body piercing the day of surgery

## 2022-10-21 ENCOUNTER — ANESTHESIA (OUTPATIENT)
Dept: OPERATING ROOM | Age: 27
End: 2022-10-21

## 2022-10-21 ENCOUNTER — HOSPITAL ENCOUNTER (OUTPATIENT)
Age: 27
Setting detail: OUTPATIENT SURGERY
Discharge: HOME | End: 2022-10-21
Attending: SURGERY | Admitting: SURGERY
Payer: COMMERCIAL

## 2022-10-21 VITALS
OXYGEN SATURATION: 98 % | BODY MASS INDEX: 28.28 KG/M2 | RESPIRATION RATE: 15 BRPM | TEMPERATURE: 97 F | HEART RATE: 79 BPM | DIASTOLIC BLOOD PRESSURE: 93 MMHG | HEIGHT: 71 IN | WEIGHT: 202 LBS | SYSTOLIC BLOOD PRESSURE: 118 MMHG

## 2022-10-21 DIAGNOSIS — R22.2 MASS ON BACK: ICD-10-CM

## 2022-10-21 PROCEDURE — 2709999900 HC NON-CHARGEABLE SUPPLY: Performed by: SURGERY

## 2022-10-21 PROCEDURE — 88304 TISSUE EXAM BY PATHOLOGIST: CPT

## 2022-10-21 PROCEDURE — 2500000003 HC RX 250 WO HCPCS: Performed by: NURSE ANESTHETIST, CERTIFIED REGISTERED

## 2022-10-21 PROCEDURE — 2500000003 HC RX 250 WO HCPCS: Performed by: SURGERY

## 2022-10-21 PROCEDURE — 3700000000 HC ANESTHESIA ATTENDED CARE: Performed by: SURGERY

## 2022-10-21 PROCEDURE — 13102 CMPLX RPR TRUNK ADDL 5CM/<: CPT | Performed by: SURGERY

## 2022-10-21 PROCEDURE — 6360000002 HC RX W HCPCS: Performed by: SURGERY

## 2022-10-21 PROCEDURE — 3600000012 HC SURGERY LEVEL 2 ADDTL 15MIN: Performed by: SURGERY

## 2022-10-21 PROCEDURE — 2580000003 HC RX 258: Performed by: ANESTHESIOLOGY

## 2022-10-21 PROCEDURE — 6360000002 HC RX W HCPCS: Performed by: NURSE ANESTHETIST, CERTIFIED REGISTERED

## 2022-10-21 PROCEDURE — 13101 CMPLX RPR TRUNK 2.6-7.5 CM: CPT | Performed by: SURGERY

## 2022-10-21 PROCEDURE — 7100000010 HC PHASE II RECOVERY - FIRST 15 MIN: Performed by: SURGERY

## 2022-10-21 PROCEDURE — 3600000002 HC SURGERY LEVEL 2 BASE: Performed by: SURGERY

## 2022-10-21 PROCEDURE — 7100000011 HC PHASE II RECOVERY - ADDTL 15 MIN: Performed by: SURGERY

## 2022-10-21 PROCEDURE — 3700000001 HC ADD 15 MINUTES (ANESTHESIA): Performed by: SURGERY

## 2022-10-21 PROCEDURE — 11406 EXC TR-EXT B9+MARG >4.0 CM: CPT | Performed by: SURGERY

## 2022-10-21 PROCEDURE — 2580000003 HC RX 258: Performed by: NURSE ANESTHETIST, CERTIFIED REGISTERED

## 2022-10-21 PROCEDURE — 2580000003 HC RX 258: Performed by: SURGERY

## 2022-10-21 RX ORDER — ONDANSETRON 2 MG/ML
INJECTION INTRAMUSCULAR; INTRAVENOUS PRN
Status: DISCONTINUED | OUTPATIENT
Start: 2022-10-21 | End: 2022-10-21 | Stop reason: SDUPTHER

## 2022-10-21 RX ORDER — SODIUM CHLORIDE, SODIUM LACTATE, POTASSIUM CHLORIDE, CALCIUM CHLORIDE 600; 310; 30; 20 MG/100ML; MG/100ML; MG/100ML; MG/100ML
INJECTION, SOLUTION INTRAVENOUS CONTINUOUS
Status: DISCONTINUED | OUTPATIENT
Start: 2022-10-21 | End: 2022-10-21 | Stop reason: HOSPADM

## 2022-10-21 RX ORDER — LABETALOL HYDROCHLORIDE 5 MG/ML
10 INJECTION, SOLUTION INTRAVENOUS
Status: DISCONTINUED | OUTPATIENT
Start: 2022-10-21 | End: 2022-10-21 | Stop reason: HOSPADM

## 2022-10-21 RX ORDER — OXYCODONE HYDROCHLORIDE AND ACETAMINOPHEN 5; 325 MG/1; MG/1
2 TABLET ORAL
Status: DISCONTINUED | OUTPATIENT
Start: 2022-10-21 | End: 2022-10-21 | Stop reason: HOSPADM

## 2022-10-21 RX ORDER — MORPHINE SULFATE 2 MG/ML
2 INJECTION, SOLUTION INTRAMUSCULAR; INTRAVENOUS EVERY 5 MIN PRN
Status: DISCONTINUED | OUTPATIENT
Start: 2022-10-21 | End: 2022-10-21 | Stop reason: HOSPADM

## 2022-10-21 RX ORDER — PROMETHAZINE HYDROCHLORIDE 25 MG/ML
6.25 INJECTION, SOLUTION INTRAMUSCULAR; INTRAVENOUS EVERY 5 MIN PRN
Status: DISCONTINUED | OUTPATIENT
Start: 2022-10-21 | End: 2022-10-21 | Stop reason: HOSPADM

## 2022-10-21 RX ORDER — MIDAZOLAM HYDROCHLORIDE 1 MG/ML
INJECTION INTRAMUSCULAR; INTRAVENOUS PRN
Status: DISCONTINUED | OUTPATIENT
Start: 2022-10-21 | End: 2022-10-21 | Stop reason: SDUPTHER

## 2022-10-21 RX ORDER — NEOSTIGMINE METHYLSULFATE 1 MG/ML
INJECTION, SOLUTION INTRAVENOUS PRN
Status: DISCONTINUED | OUTPATIENT
Start: 2022-10-21 | End: 2022-10-21 | Stop reason: SDUPTHER

## 2022-10-21 RX ORDER — SODIUM CHLORIDE 9 MG/ML
INJECTION, SOLUTION INTRAVENOUS PRN
Status: DISCONTINUED | OUTPATIENT
Start: 2022-10-21 | End: 2022-10-21 | Stop reason: HOSPADM

## 2022-10-21 RX ORDER — SODIUM CHLORIDE 9 MG/ML
INJECTION, SOLUTION INTRAVENOUS CONTINUOUS
Status: DISCONTINUED | OUTPATIENT
Start: 2022-10-21 | End: 2022-10-21 | Stop reason: HOSPADM

## 2022-10-21 RX ORDER — BUPIVACAINE HYDROCHLORIDE 5 MG/ML
INJECTION, SOLUTION PERINEURAL PRN
Status: DISCONTINUED | OUTPATIENT
Start: 2022-10-21 | End: 2022-10-21 | Stop reason: ALTCHOICE

## 2022-10-21 RX ORDER — MIDAZOLAM HYDROCHLORIDE 2 MG/2ML
2 INJECTION, SOLUTION INTRAMUSCULAR; INTRAVENOUS
Status: DISCONTINUED | OUTPATIENT
Start: 2022-10-21 | End: 2022-10-21 | Stop reason: HOSPADM

## 2022-10-21 RX ORDER — SODIUM CHLORIDE 0.9 % (FLUSH) 0.9 %
5-40 SYRINGE (ML) INJECTION EVERY 12 HOURS SCHEDULED
Status: DISCONTINUED | OUTPATIENT
Start: 2022-10-21 | End: 2022-10-21 | Stop reason: HOSPADM

## 2022-10-21 RX ORDER — SODIUM CHLORIDE 0.9 % (FLUSH) 0.9 %
5-40 SYRINGE (ML) INJECTION PRN
Status: DISCONTINUED | OUTPATIENT
Start: 2022-10-21 | End: 2022-10-21 | Stop reason: HOSPADM

## 2022-10-21 RX ORDER — BUPIVACAINE HYDROCHLORIDE 5 MG/ML
INJECTION, SOLUTION EPIDURAL; INTRACAUDAL
Status: DISCONTINUED
Start: 2022-10-21 | End: 2022-10-21 | Stop reason: HOSPADM

## 2022-10-21 RX ORDER — DIPHENHYDRAMINE HYDROCHLORIDE 50 MG/ML
12.5 INJECTION INTRAMUSCULAR; INTRAVENOUS
Status: DISCONTINUED | OUTPATIENT
Start: 2022-10-21 | End: 2022-10-21 | Stop reason: HOSPADM

## 2022-10-21 RX ORDER — SODIUM CHLORIDE 9 MG/ML
25 INJECTION, SOLUTION INTRAVENOUS PRN
Status: DISCONTINUED | OUTPATIENT
Start: 2022-10-21 | End: 2022-10-21 | Stop reason: HOSPADM

## 2022-10-21 RX ORDER — ACETAMINOPHEN 500 MG
1000 TABLET ORAL 3 TIMES DAILY
Qty: 90 TABLET | Refills: 1 | Status: SHIPPED | OUTPATIENT
Start: 2022-10-21

## 2022-10-21 RX ORDER — PROPOFOL 10 MG/ML
INJECTION, EMULSION INTRAVENOUS PRN
Status: DISCONTINUED | OUTPATIENT
Start: 2022-10-21 | End: 2022-10-21 | Stop reason: SDUPTHER

## 2022-10-21 RX ORDER — IPRATROPIUM BROMIDE AND ALBUTEROL SULFATE 2.5; .5 MG/3ML; MG/3ML
1 SOLUTION RESPIRATORY (INHALATION)
Status: DISCONTINUED | OUTPATIENT
Start: 2022-10-21 | End: 2022-10-21 | Stop reason: HOSPADM

## 2022-10-21 RX ORDER — SODIUM CHLORIDE, SODIUM LACTATE, POTASSIUM CHLORIDE, CALCIUM CHLORIDE 600; 310; 30; 20 MG/100ML; MG/100ML; MG/100ML; MG/100ML
INJECTION, SOLUTION INTRAVENOUS CONTINUOUS PRN
Status: DISCONTINUED | OUTPATIENT
Start: 2022-10-21 | End: 2022-10-21 | Stop reason: SDUPTHER

## 2022-10-21 RX ORDER — GLYCOPYRROLATE 1 MG/5 ML
SYRINGE (ML) INTRAVENOUS PRN
Status: DISCONTINUED | OUTPATIENT
Start: 2022-10-21 | End: 2022-10-21 | Stop reason: SDUPTHER

## 2022-10-21 RX ORDER — ROCURONIUM BROMIDE 10 MG/ML
INJECTION, SOLUTION INTRAVENOUS PRN
Status: DISCONTINUED | OUTPATIENT
Start: 2022-10-21 | End: 2022-10-21 | Stop reason: SDUPTHER

## 2022-10-21 RX ORDER — IBUPROFEN 800 MG/1
800 TABLET ORAL
Qty: 42 TABLET | Refills: 0 | Status: SHIPPED | OUTPATIENT
Start: 2022-10-21 | End: 2022-11-04

## 2022-10-21 RX ORDER — LIDOCAINE HYDROCHLORIDE 10 MG/ML
INJECTION, SOLUTION INFILTRATION; PERINEURAL PRN
Status: DISCONTINUED | OUTPATIENT
Start: 2022-10-21 | End: 2022-10-21

## 2022-10-21 RX ORDER — CEFAZOLIN 2 G/1
INJECTION, POWDER, FOR SOLUTION INTRAMUSCULAR; INTRAVENOUS
Status: DISCONTINUED
Start: 2022-10-21 | End: 2022-10-21 | Stop reason: HOSPADM

## 2022-10-21 RX ORDER — MEPERIDINE HYDROCHLORIDE 50 MG/ML
12.5 INJECTION INTRAMUSCULAR; INTRAVENOUS; SUBCUTANEOUS EVERY 5 MIN PRN
Status: DISCONTINUED | OUTPATIENT
Start: 2022-10-21 | End: 2022-10-21 | Stop reason: HOSPADM

## 2022-10-21 RX ORDER — ONDANSETRON 2 MG/ML
4 INJECTION INTRAMUSCULAR; INTRAVENOUS
Status: DISCONTINUED | OUTPATIENT
Start: 2022-10-21 | End: 2022-10-21 | Stop reason: HOSPADM

## 2022-10-21 RX ORDER — GLYCOPYRROLATE 0.2 MG/ML
0.4 INJECTION INTRAMUSCULAR; INTRAVENOUS ONCE
Status: DISCONTINUED | OUTPATIENT
Start: 2022-10-21 | End: 2022-10-21 | Stop reason: HOSPADM

## 2022-10-21 RX ORDER — DEXAMETHASONE SODIUM PHOSPHATE 10 MG/ML
INJECTION, SOLUTION INTRAMUSCULAR; INTRAVENOUS PRN
Status: DISCONTINUED | OUTPATIENT
Start: 2022-10-21 | End: 2022-10-21 | Stop reason: SDUPTHER

## 2022-10-21 RX ORDER — OXYCODONE HYDROCHLORIDE AND ACETAMINOPHEN 5; 325 MG/1; MG/1
1 TABLET ORAL
Status: DISCONTINUED | OUTPATIENT
Start: 2022-10-21 | End: 2022-10-21 | Stop reason: HOSPADM

## 2022-10-21 RX ORDER — FENTANYL CITRATE 50 UG/ML
INJECTION, SOLUTION INTRAMUSCULAR; INTRAVENOUS PRN
Status: DISCONTINUED | OUTPATIENT
Start: 2022-10-21 | End: 2022-10-21 | Stop reason: SDUPTHER

## 2022-10-21 RX ORDER — LIDOCAINE HYDROCHLORIDE 10 MG/ML
INJECTION, SOLUTION INFILTRATION; PERINEURAL PRN
Status: DISCONTINUED | OUTPATIENT
Start: 2022-10-21 | End: 2022-10-21 | Stop reason: SDUPTHER

## 2022-10-21 RX ADMIN — LIDOCAINE HYDROCHLORIDE 40 MG: 10 INJECTION, SOLUTION INFILTRATION; PERINEURAL at 08:46

## 2022-10-21 RX ADMIN — ONDANSETRON 4 MG: 2 INJECTION INTRAMUSCULAR; INTRAVENOUS at 09:17

## 2022-10-21 RX ADMIN — MIDAZOLAM 2 MG: 1 INJECTION INTRAMUSCULAR; INTRAVENOUS at 08:44

## 2022-10-21 RX ADMIN — SODIUM CHLORIDE, POTASSIUM CHLORIDE, SODIUM LACTATE AND CALCIUM CHLORIDE: 600; 310; 30; 20 INJECTION, SOLUTION INTRAVENOUS at 07:58

## 2022-10-21 RX ADMIN — DEXAMETHASONE SODIUM PHOSPHATE 10 MG: 10 INJECTION, SOLUTION INTRAMUSCULAR; INTRAVENOUS at 08:44

## 2022-10-21 RX ADMIN — NEOSTIGMINE METHYLSULFATE 4 MG: 1 INJECTION INTRAVENOUS at 09:16

## 2022-10-21 RX ADMIN — CEFAZOLIN 2000 MG: 2 INJECTION, POWDER, FOR SOLUTION INTRAMUSCULAR; INTRAVENOUS at 08:52

## 2022-10-21 RX ADMIN — Medication 0.8 MG: at 09:16

## 2022-10-21 RX ADMIN — SODIUM CHLORIDE, POTASSIUM CHLORIDE, SODIUM LACTATE AND CALCIUM CHLORIDE: 600; 310; 30; 20 INJECTION, SOLUTION INTRAVENOUS at 08:39

## 2022-10-21 RX ADMIN — FENTANYL CITRATE 100 MCG: 50 INJECTION, SOLUTION INTRAMUSCULAR; INTRAVENOUS at 08:44

## 2022-10-21 RX ADMIN — PROPOFOL 150 MG: 10 INJECTION, EMULSION INTRAVENOUS at 08:44

## 2022-10-21 RX ADMIN — ROCURONIUM BROMIDE 50 MG: 10 INJECTION, SOLUTION INTRAVENOUS at 08:44

## 2022-10-21 ASSESSMENT — PAIN - FUNCTIONAL ASSESSMENT: PAIN_FUNCTIONAL_ASSESSMENT: NONE - DENIES PAIN

## 2022-10-21 ASSESSMENT — PAIN SCALES - GENERAL: PAINLEVEL_OUTOF10: 0

## 2022-10-21 NOTE — INTERVAL H&P NOTE
Update History & Physical    The patient's History and Physical of 10/11/2022  was reviewed with the patient and I examined the patient. There was no change. The surgical site was confirmed by the patient and me. Plan: The risks, benefits, expected outcome, and alternative to the recommended procedure have been discussed with the patient. Patient understands and wants to proceed with the procedure.      Electronically signed by Alexandria Mcbride MD on 10/21/2022 at 8:32 AM

## 2022-10-21 NOTE — ANESTHESIA POSTPROCEDURE EVALUATION
Department of Anesthesiology  Postprocedure Note    Patient: Areta Scheuermann  MRN: [id number]  YOB: 1995  Date of evaluation: 10/21/2022      Procedure Summary     Date: 10/21/22 Room / Location: [address]    Anesthesia Start: 9361 Anesthesia Stop: 2125    Procedure: LEFT BACK MASS EXCISION (Left: Back) Diagnosis:       Mass on back      (Mass on back [R22.2])    Surgeons: Rich Jerry MD Responsible Provider: Rajani Heaton MD    Anesthesia Type: general ASA Status: 2          Anesthesia Type: No value filed.     Cate Phase I: Cate Score: 10    Cate Phase II: Cate Score: 10      Anesthesia Post Evaluation    Patient location during evaluation: PACU  Patient participation: complete - patient participated  Level of consciousness: awake and alert  Airway patency: patent  Nausea & Vomiting: no nausea and no vomiting  Complications: no  Cardiovascular status: hemodynamically stable  Respiratory status: room air and spontaneous ventilation  Hydration status: euvolemic  Multimodal analgesia pain management approach

## 2022-10-21 NOTE — DISCHARGE INSTRUCTIONS
Activity   You have had anesthesia today  Do not drive, operate heavy equipment, consume alcoholic beverages, or make any important decisions  for 24 hours   If you are taking pain medication: Do not drive or consume alcohol. Take your time changing positions today. You may feel light headed or dizzy if you move too quickly. Continue your home medications as ordered by your physician. Diet   You can eat your normal diet when you feel well. You should start off with bland foods like chicken soup, toast, or yogurt. Then advance as tolerated. Drink plenty of fluids (unless your doctor tells you not to). Your urine should be very lightly colored without a strong odor. Incision:  You can shower tomorrow  You have a glue covering the incision, do not pick on it, it will peel on its own  No heavy lifting or vigorous back bending  You can apply ice packs on the incision site within the next 48 hours at it helps with the swelling and pain    Amanda Cano MD [facility]  Surgical Oncology/HPB Surgery  [facility]  [address]  [facility] [address]  Office:  [phone]  Fax:  [phone]  10/21/22   9:41 AM

## 2022-10-21 NOTE — OP NOTE
DATE OF PROCEDURE: 10/21/22      PATIENT NAME: Burton Ellsworth  MEDICAL RECORD NUMBER: [id number]    PREOPERATIVE DIAGNOSES:  1. Hodgkin's lymphoma  2. Left lower back lesion     POSTOPERATIVE DIAGNOSES:  Same as above     NAME OF PROCEDURE:  1. Excision of left lower back lesion     SURGEON: MD BRIGHT Evans     ASSISTANT: None     ANESTHESIA: General.     ANESTHESIOLOGIST: Wilhemenia Hodgkins, MD     ESTIMATED BLOOD LOSS: Minimal      PREOPERATIVE INDICATIONS: Burton Ellsworth is a 32 y.o. male with a history of Hodgkin's lymphoma in the neck cured with radiation in 2012. He is BRISA since then. He has recently noticed a nodule in the left lower back. It is not bothering him much but is giving him a lot of anxiety given his history of lymphoma. He underwent ultrasound which demonstrated a well-defined solid heterogeneous nonvascular lesion measuring 1.8 x 1.3 x 2.6 cm in the left lower back. Differential is soft tissue tumor versus sebaceous cyst.  He is pretty active and works as a . He consented to proceed with excision of the lesion. All benefits, risks, and alternatives were explained to the patient in details and an informed consent was obtained. DETAILS OF PROCEDURE: The patient was brought to the operating room, and placed supine in the operating table with appropriate padding. Sequential compressive devices were applied on bilateral lower extremities. Anesthesia was induced per the anesthesia team via endotracheal intubation. Appropriate lines were placed by anesthesia. Preoperative antibiotics were given. The patient was prepped and draped in standard sterile technique for the procedure. Timeout was done verifying the correct patient site, laterality and procedure. A 3 cm incision was made and deepened to the subcutaneous tissue. The lesion was identified and dissected circumferentially from the subcutaneous tissue.   The superficial aspect of the cyst excised breast

## 2022-10-21 NOTE — ANESTHESIA PRE PROCEDURE
Patient Active Problem List   Diagnosis Code    Chronic migraine without aura without status migrainosus, not intractable G43.709    History of Hodgkin's disease Z85.71    Low HDL (under 40) E78.6    Vitamin D deficiency E55.9       Past Medical History:        Diagnosis Date    Headache     Hodgkin's lymphoma ([facility] [address] 2012       Past Surgical History:        Procedure Laterality Date    FOOT SURGERY      2 years ago  1 plate 6 screws     NECK SURGERY      lymph node removal and biopsy    TYMPANOSTOMY TUBE PLACEMENT         Social History:    Social History     Tobacco Use    Smoking status: Never    Smokeless tobacco: Never   Substance Use Topics    Alcohol use: Yes     Alcohol/week: 12.0 standard drinks     Types: 6 Cans of beer, 6 Shots of liquor per week     Comment: not daily                                Counseling given: Not Answered      Vital Signs (Current):   Vitals:    10/21/22 0739   BP: 121/86   Pulse: 66   Resp: 19   Temp: 97.9 °F (36.6 °C)   TempSrc: Infrared   SpO2: 98%   Weight: 202 lb (91.6 kg)   Height: 5' 11\" (1.803 m)                                              BP Readings from Last 3 Encounters:   10/21/22 121/86   10/11/22 (!) 156/93   08/30/22 (!) 145/84       NPO Status: Time of last liquid consumption: 2100                        Time of last solid consumption: 2100                        Date of last liquid consumption: 10/20/22                        Date of last solid food consumption: 10/20/22    BMI:   Wt Readings from Last 3 Encounters:   10/21/22 202 lb (91.6 kg)   10/11/22 204 lb 9.6 oz (92.8 kg)   08/30/22 199 lb (90.3 kg)     Body mass index is 28.17 kg/m².     CBC:   Lab Results   Component Value Date/Time    WBC 5.9 10/11/2022 04:12 PM    RBC 4.84 10/11/2022 04:12 PM    HGB 14.0 10/11/2022 04:12 PM    HCT 41.4 10/11/2022 04:12 PM    MCV 85.5 10/11/2022 04:12 PM    RDW 12.3 10/11/2022 04:12 PM     10/11/2022 04:12 PM       CMP:   Lab Results   Component Value Date/Time     10/11/2022 04:12 PM    K 4.1 10/11/2022 04:12 PM     10/11/2022 04:12 PM    CO2 25 10/11/2022 04:12 PM    BUN 14 10/11/2022 04:12 PM    CREATININE 0.93 10/11/2022 04:12 PM    LABGLOM >60 10/11/2022 04:12 PM    GLUCOSE 76 10/11/2022 04:12 PM    PROT 7.1 10/11/2022 04:12 PM    CALCIUM 9.0 10/11/2022 04:12 PM    BILITOT 0.3 10/11/2022 04:12 PM    ALKPHOS 64 10/11/2022 04:12 PM    AST 23 10/11/2022 04:12 PM    ALT 29 10/11/2022 04:12 PM       POC Tests: No results for input(s): POCGLU, POCNA, POCK, POCCL, POCBUN, POCHEMO, POCHCT in the last 72 hours. Coags:   Lab Results   Component Value Date/Time    PROTIME 9.4 10/11/2022 04:12 PM    INR 0.9 10/11/2022 04:12 PM       HCG (If Applicable): No results found for: PREGTESTUR, PREGSERUM, HCG, HCGQUANT     ABGs: No results found for: PHART, PO2ART, IXT6WOG, IAS6RFU, BEART, I7PPTOYI     Type & Screen (If Applicable):  No results found for: LABABO, LABRH    Drug/Infectious Status (If Applicable):  No results found for: HIV, HEPCAB    COVID-19 Screening (If Applicable): No results found for: COVID19        Anesthesia Evaluation  Patient summary reviewed and Nursing notes reviewed  Airway: Mallampati: III  TM distance: >3 FB   Neck ROM: full  Mouth opening: > = 3 FB   Dental: normal exam         Pulmonary:normal exam                              ROS comment: -VAPES FOR PAST 2 YEARS   Cardiovascular:Negative CV ROS                      Neuro/Psych:   Negative Neuro/Psych ROS              GI/Hepatic/Renal: Neg GI/Hepatic/Renal ROS            Endo/Other:    (+) malignancy/cancer. ROS comment: -LYMPHOMA  -NPO AFTER MIDNIGHT  -ALLERGIES - SULFA Abdominal:             Vascular: negative vascular ROS. Other Findings:           Anesthesia Plan      general     ASA 2     (LMA IF LATERAL POSITION, GETA IF PRONE POSITION)  Induction: intravenous.     MIPS: Postoperative opioids intended and Prophylactic antiemetics administered. Anesthetic plan and risks discussed with patient. Plan discussed with CRNA.     Attending anesthesiologist reviewed and agrees with Perla Benton MD   10/21/2022

## 2022-10-25 LAB — DERMATOLOGY PATHOLOGY REPORT: NORMAL

## 2022-10-27 ENCOUNTER — TELEPHONE (OUTPATIENT)
Dept: SURGERY | Age: 27
End: 2022-10-27

## 2022-10-27 NOTE — TELEPHONE ENCOUNTER
Called Alessia Doll and updated him about pathology. He has appt on Tuesday 11/1/2022nfor wound check. Amanda Cano MD [facility]  Surgical Oncology/HPB Surgery  [address] [facility]  [address]  [facility] [address]  Office:  [phone]  Fax:  [phone]  10/27/22   4:41 PM

## 2022-10-31 ASSESSMENT — ENCOUNTER SYMPTOMS
SHORTNESS OF BREATH: 0
WHEEZING: 0
ABDOMINAL DISTENTION: 0
COUGH: 0
DIARRHEA: 0
ABDOMINAL PAIN: 0
CONSTIPATION: 0
NAUSEA: 0
VOMITING: 0
BLOOD IN STOOL: 0
BACK PAIN: 0
CHEST TIGHTNESS: 0

## 2022-10-31 NOTE — PROGRESS NOTES
(ADVIL;MOTRIN) 800 MG tablet Take 1 tablet by mouth 3 times daily (with meals) for 14 days 10/21/22 11/4/22 Yes Amanda Cano MD   acetaminophen (TYLENOL) 500 MG tablet Take 2 tablets by mouth 3 times daily 10/21/22  Yes Mari Slade MD   SUMAtriptan (IMITREX) 100 MG tablet take 1 tablet by mouth  Holderrieth Tippo may repeat in 2 hours IF headache PERSISTS maximum daily dose of 2 tablets ( 200 milligrams ) every 24 hours 8/30/22  Yes RAYMOND Gould - CNP       Allergies:  Sulfa antibiotics    Social History:   TOBACCO:   reports that he has never smoked. He has never used smokeless tobacco.  ETOH:   reports current alcohol use of about 12.0 standard drinks per week. OCCUPATION:      Family History:       Problem Relation Age of Onset    Other Mother         migraines    Migraines Mother     Migraines Maternal Grandmother     Brain Cancer Maternal Grandfather         brain    Cancer Paternal Grandmother         jaw cancer    No Known Problems Paternal Grandfather     Breast Cancer Neg Hx     Ovarian Cancer Neg Hx     Prostate Cancer Neg Hx     Colon Cancer Neg Hx     Heart Disease Neg Hx        REVIEW OF SYSTEMS:  Review of Systems   Constitutional:  Negative for chills, fatigue, fever and unexpected weight change. Eyes:  Negative for visual disturbance. Respiratory:  Negative for cough, chest tightness, shortness of breath and wheezing. Cardiovascular:  Negative for chest pain, palpitations and leg swelling. Gastrointestinal:  Negative for abdominal distention, abdominal pain, blood in stool, constipation, diarrhea, nausea and vomiting. Genitourinary:  Negative for dysuria, hematuria and urgency. Musculoskeletal:  Negative for back pain. Skin:  Negative for rash. Neurological:  Negative for dizziness, seizures, syncope, speech difficulty, weakness, light-headedness, numbness and headaches. Hematological:  Negative for adenopathy. Does not bruise/bleed easily.    Psychiatric/Behavioral: The patient is not nervous/anxious. Physical Exam:    Vitals: /89 (Site: Left Upper Arm, Position: Sitting, Cuff Size: Large Adult)   Pulse 76   Ht 5' 11\" (1.803 m)   Wt 205 lb 3.2 oz (93.1 kg)   BMI 28.62 kg/m²   General appearance: alert, appears stated age and cooperative  Skin: Skin color, texture, turgor normal. No rashes or lesions  HEENT: Head: Normocephalic, no lesions, without obvious abnormality. Neck: no adenopathy, no carotid bruit, no JVD, supple, symmetrical, trachea midline, and thyroid not enlarged, symmetric, no tenderness/mass/nodules  Lungs: Breathing comfortably  Heart: RRR  Back: Incision clean dry intact with Dermabond, no erythema, scab  Extremities: extremities normal, atraumatic, no cyanosis or edema  Neurologic: Mental status: Alert, oriented, thought content appropriate    CBC:   Lab Results   Component Value Date    WBC 5.9 10/11/2022    HGB 14.0 10/11/2022    HCT 41.4 10/11/2022    MCV 85.5 10/11/2022     10/11/2022     BMP:    Lab Results   Component Value Date/Time     10/11/2022 04:12 PM    K 4.1 10/11/2022 04:12 PM     10/11/2022 04:12 PM    CO2 25 10/11/2022 04:12 PM    BUN 14 10/11/2022 04:12 PM    CREATININE 0.93 10/11/2022 04:12 PM    GLUCOSE 76 10/11/2022 04:12 PM    CALCIUM 9.0 10/11/2022 04:12 PM      Hepatic:   Lab Results   Component Value Date/Time    ALKPHOS 64 10/11/2022 04:12 PM    ALT 29 10/11/2022 04:12 PM    AST 23 10/11/2022 04:12 PM    PROT 7.1 10/11/2022 04:12 PM    BILITOT 0.3 10/11/2022 04:12 PM    LABALBU 4.6 10/11/2022 04:12 PM      Imaging   Ultrasound Result (most recent):  US ABDOMEN LIMITED 09/19/2022    Narrative  EXAMINATION:  Limited ultrasound of the abdomen    9/19/2022 1:58 pm    COMPARISON:  None. HISTORY:  ORDERING SYSTEM PROVIDED HISTORY: Nodule of skin of back  TECHNOLOGIST PROVIDED HISTORY:  This procedure can be scheduled via [employer].   Access your [employer] account by  visiting [email or web address].  5 month hx of firm nodule on left low back. hx of nodudular predominant  hodgkins lymphoma  Specify organ?->    FINDINGS:  Along the left posterior dorsum, there is a well-defined solid heterogeneous  nonvascular subcutaneous lesion. It measures 1.8 x 1.3 x 2.6 cm. Portion of  the lesion appear to extend through the dermis. Possibilities include a soft  tissue tumor or sebaceous cyst.    Impression  Along the left posterior dorsum, there is a well-defined solid heterogeneous  nonvascular lesion. It measures 1.8 x 1.3 x 2.6 cm. Portions of the lesion  appear to extend through the dermis. Possibilities include a soft tissue  tumor or sebaceous cyst.  Consideration for tissue sampling should be given. The findings were sent to the Radiology Results [address] at 11:03  am on 9/20/2022 to be communicated to a licensed caregiver. Pathology :  RUPTURED EPIDERMOID CYST    Assessment and Plan   Left lower back sebaceous cyst   Excised and reviewed pathology with patient. No need for follow-up. Advised him to shower and let the glue peel off.    2.  History of neck radiation  I discussed the need for thyroid ultrasound given the history of radiation. He would like to follow that with his primary care physician. Patient Active Problem List   Diagnosis Code    Chronic migraine without aura without status migrainosus, not intractable G43.709    History of Hodgkin's disease Z85.71    Low HDL (under 40) E78.6    Vitamin D deficiency E55.9       Michael Jaffe was seen today for post-op check. Diagnoses and all orders for this visit:    Sebaceous cyst    History of Hodgkin's disease       Amanda Cano MD [facility]  Surgical Oncology/HPB Surgery  [facility]  [address]  [facility] [address]  Office:  [phone]  Fax:  [phone]  11/01/22   9:17 AM    CC: Evelyn Lowery MD  CC: Azucena Lynch MD

## 2022-11-01 ENCOUNTER — OFFICE VISIT (OUTPATIENT)
Dept: SURGERY | Age: 27
End: 2022-11-01

## 2022-11-01 VITALS
DIASTOLIC BLOOD PRESSURE: 89 MMHG | BODY MASS INDEX: 28.73 KG/M2 | HEIGHT: 71 IN | WEIGHT: 205.2 LBS | HEART RATE: 76 BPM | SYSTOLIC BLOOD PRESSURE: 127 MMHG

## 2022-11-01 DIAGNOSIS — Z85.71 HISTORY OF HODGKIN'S DISEASE: ICD-10-CM

## 2022-11-01 DIAGNOSIS — L72.3 SEBACEOUS CYST: Primary | ICD-10-CM

## 2022-11-01 PROCEDURE — 99024 POSTOP FOLLOW-UP VISIT: CPT | Performed by: SURGERY

## 2022-12-02 ENCOUNTER — OFFICE VISIT (OUTPATIENT)
Dept: SURGERY | Age: 27
End: 2022-12-02

## 2022-12-02 DIAGNOSIS — L72.3 SEBACEOUS CYST: Primary | ICD-10-CM

## 2022-12-02 DIAGNOSIS — T81.30XA WOUND DEHISCENCE: ICD-10-CM

## 2022-12-02 RX ORDER — CLINDAMYCIN HYDROCHLORIDE 300 MG/1
300 CAPSULE ORAL 4 TIMES DAILY
Qty: 28 CAPSULE | Refills: 0 | Status: SHIPPED | OUTPATIENT
Start: 2022-12-02 | End: 2022-12-09

## 2022-12-02 ASSESSMENT — ENCOUNTER SYMPTOMS
VOMITING: 0
DIARRHEA: 0
SHORTNESS OF BREATH: 0
BLOOD IN STOOL: 0
WHEEZING: 0
ABDOMINAL DISTENTION: 0
ABDOMINAL PAIN: 0
NAUSEA: 0
COUGH: 0
CHEST TIGHTNESS: 0
CONSTIPATION: 0
BACK PAIN: 0

## 2022-12-02 NOTE — PROGRESS NOTES
[address] [facility]  [address] [address] [address]  Dept: [phone]                 Clinic History and Physical    Patient:  Any Ham  MRN: [id number]    CHIEF COMPLAINT:  had no chief complaint listed for this encounter. PCP: Elias Read MD  Referring Physician: Nadine Velazquez MD    HISTORY OF PRESENT ILLNESS:   The patient is a 32 y.o. male with a history of Hodgkin's lymphoma in the neck cured with radiation in 2012. He is BRISA since then. He comes with his mother and girlfriend Snow. He recently noticed a nodule in the left lower back. It is not bothering him much but is giving him a lot of anxiety given his history of lymphoma. He underwent ultrasound which demonstrated a well-defined solid heterogeneous nonvascular lesion measuring 1.8 x 1.3 x 2.6 cm in the left lower back. Differential is soft tissue tumor versus sebaceous cyst.  He is pretty active and works as a . He underwent excision of left lower back lesion on 10/21/22 and final pathology came as sebaceous cyst.  He has been doing well there is a scab on the incision however this has been healing well with some itching. 12/2/2022: He called the office earlier with complaint of wound to drainage and nonhealing. He denies fever. There is no erythema around the wound.     Past Medical History:        Diagnosis Date    Headache     Hodgkin's lymphoma ([address] 2012       Past Surgical History:        Procedure Laterality Date    FOOT SURGERY      2 years ago  1 plate 6 screws     NECK SURGERY      lymph node removal and biopsy    PRE-MALIGNANT / BENIGN SKIN LESION EXCISION  10/21/2022    LEFT BACK MASS EXCISION    SKIN LESION EXCISION Left 10/21/2022    back    SKIN LESION EXCISION Left 10/21/2022    LEFT BACK MASS EXCISION performed by Savage Shaw MD at [facility] OR    TYMPANOSTOMY TUBE PLACEMENT         Medications Prior to Admission:    Prior to Admission medications    Medication Sig Start Date End Date Taking? Authorizing Provider   clindamycin (CLEOCIN) 300 MG capsule Take 1 capsule by mouth 4 times daily for 7 days 12/2/22 12/9/22 Yes Rich Jerry MD   ibuprofen (ADVIL;MOTRIN) 800 MG tablet Take 1 tablet by mouth 3 times daily (with meals) for 14 days 10/21/22 11/4/22  Rich Jerry MD   acetaminophen (TYLENOL) 500 MG tablet Take 2 tablets by mouth 3 times daily 10/21/22   Rich Jerry MD   SUMAtriptan (IMITREX) 100 MG tablet take 1 tablet by mouth  Holderrieth [place] may repeat in 2 hours IF headache PERSISTS maximum daily dose of 2 tablets ( 200 milligrams ) every 24 hours 8/30/22   RAYMOND Khalil - CNP       Allergies:  Sulfa antibiotics    Social History:   TOBACCO:   reports that he has never smoked. He has never used smokeless tobacco.  ETOH:   reports current alcohol use of about 12.0 standard drinks per week. OCCUPATION:      Family History:       Problem Relation Age of Onset    Other Mother         migraines    Migraines Mother     Migraines Maternal Grandmother     Brain Cancer Maternal Grandfather         brain    Cancer Paternal Grandmother         jaw cancer    No Known Problems Paternal Grandfather     Breast Cancer Neg Hx     Ovarian Cancer Neg Hx     Prostate Cancer Neg Hx     Colon Cancer Neg Hx     Heart Disease Neg Hx        REVIEW OF SYSTEMS:  Review of Systems   Constitutional:  Negative for chills, fatigue, fever and unexpected weight change. Eyes:  Negative for visual disturbance. Respiratory:  Negative for cough, chest tightness, shortness of breath and wheezing. Cardiovascular:  Negative for chest pain, palpitations and leg swelling. Gastrointestinal:  Negative for abdominal distention, abdominal pain, blood in stool, constipation, diarrhea, nausea and vomiting. Genitourinary:  Negative for dysuria, hematuria and urgency. Musculoskeletal:  Negative for back pain.    Skin: Negative for rash. Neurological:  Negative for dizziness, seizures, syncope, speech difficulty, weakness, light-headedness, numbness and headaches. Hematological:  Negative for adenopathy. Does not bruise/bleed easily. Psychiatric/Behavioral:  The patient is not nervous/anxious. Physical Exam:    Vitals: There were no vitals taken for this visit. General appearance: alert, appears stated age and cooperative  Skin: Skin color, texture, turgor normal. No rashes or lesions  HEENT: Head: Normocephalic, no lesions, without obvious abnormality. Neck: no adenopathy, no carotid bruit, no JVD, supple, symmetrical, trachea midline, and thyroid not enlarged, symmetric, no tenderness/mass/nodules  Lungs: Breathing comfortably  Heart: RRR  Back: Lateral aspect of the wound has healed with granulation tissue, the medial aspect with cavity 2 x 3 cm with fibrinous tissue and serous discharge.   There is no erythema  Extremities: extremities normal, atraumatic, no cyanosis or edema  Neurologic: Mental status: Alert, oriented, thought content appropriate        CBC:   Lab Results   Component Value Date    WBC 5.9 10/11/2022    HGB 14.0 10/11/2022    HCT 41.4 10/11/2022    MCV 85.5 10/11/2022     10/11/2022     BMP:    Lab Results   Component Value Date/Time     10/11/2022 04:12 PM    K 4.1 10/11/2022 04:12 PM     10/11/2022 04:12 PM    CO2 25 10/11/2022 04:12 PM    BUN 14 10/11/2022 04:12 PM    CREATININE 0.93 10/11/2022 04:12 PM    GLUCOSE 76 10/11/2022 04:12 PM    CALCIUM 9.0 10/11/2022 04:12 PM      Hepatic:   Lab Results   Component Value Date/Time    ALKPHOS 64 10/11/2022 04:12 PM    ALT 29 10/11/2022 04:12 PM    AST 23 10/11/2022 04:12 PM    PROT 7.1 10/11/2022 04:12 PM    BILITOT 0.3 10/11/2022 04:12 PM    LABALBU 4.6 10/11/2022 04:12 PM      Imaging   Ultrasound Result (most recent):  US ABDOMEN LIMITED 09/19/2022    Narrative  EXAMINATION:  Limited ultrasound of the abdomen    9/19/2022 1:58 pm    COMPARISON:  None. HISTORY:  ORDERING SYSTEM PROVIDED HISTORY: Nodule of skin of back  TECHNOLOGIST PROVIDED HISTORY:  This procedure can be scheduled via [employer]. Access your [employer] account by  visiting [email or web address].  5 month hx of firm nodule on left low back. hx of nodudular predominant  hodgkins lymphoma  Specify organ?->    FINDINGS:  Along the left posterior dorsum, there is a well-defined solid heterogeneous  nonvascular subcutaneous lesion. It measures 1.8 x 1.3 x 2.6 cm. Portion of  the lesion appear to extend through the dermis. Possibilities include a soft  tissue tumor or sebaceous cyst.    Impression  Along the left posterior dorsum, there is a well-defined solid heterogeneous  nonvascular lesion. It measures 1.8 x 1.3 x 2.6 cm. Portions of the lesion  appear to extend through the dermis. Possibilities include a soft tissue  tumor or sebaceous cyst.  Consideration for tissue sampling should be given. The findings were sent to the Radiology Results [address] at 11:03  am on 9/20/2022 to be communicated to a licensed caregiver. Pathology :  RUPTURED EPIDERMOID CYST    Assessment and Plan   Left lower back sebaceous cyst   Excised and reviewed pathology with patient. No need for follow-up. Advised him to shower and let the glue peel off.    12/2/2022: The medial aspect of the wound was debrided and packed with NuGauze. The patient was advised to pack it daily, remove the packing, shower, and repack it. I prescribed him 7 days of clindamycin as he is allergic to Bactrim. I will see him back after he finished a course of antibiotic. He declined home health care for now as his girlfriend can help him with packing. She is EMT student. 2.  History of neck radiation  I discussed the need for thyroid ultrasound given the history of radiation. He would like to follow that with his primary care physician.     Patient Active Problem List   Diagnosis Code    Chronic migraine without aura without status migrainosus, not intractable G43.709    History of Hodgkin's disease Z85.71    Low HDL (under 40) E78.6    Vitamin D deficiency E55.9       Diagnoses and all orders for this visit:    Sebaceous cyst    Wound dehiscence    Other orders  -     clindamycin (CLEOCIN) 300 MG capsule; Take 1 capsule by mouth 4 times daily for 7 days       Amanda Cano MD [facility]  Surgical Oncology/HPB Surgery  [facility]  [address] [place] [address] [address]  [facility] [address]  Office:  [phone]  Fax:  [phone]  12/02/22   1:14 PM    CC: Joanna Zaldivar MD  CC: Nona Whiting MD

## 2022-12-14 ASSESSMENT — ENCOUNTER SYMPTOMS
SHORTNESS OF BREATH: 0
DIARRHEA: 0
NAUSEA: 0
VOMITING: 0
BACK PAIN: 0
WHEEZING: 0
BLOOD IN STOOL: 0
COUGH: 0
CHEST TIGHTNESS: 0
ABDOMINAL DISTENTION: 0
CONSTIPATION: 0
ABDOMINAL PAIN: 0

## 2022-12-15 ENCOUNTER — OFFICE VISIT (OUTPATIENT)
Dept: SURGERY | Age: 27
End: 2022-12-15

## 2022-12-15 VITALS — SYSTOLIC BLOOD PRESSURE: 149 MMHG | TEMPERATURE: 97.1 F | DIASTOLIC BLOOD PRESSURE: 101 MMHG | HEART RATE: 79 BPM

## 2022-12-15 DIAGNOSIS — Z85.71 HISTORY OF HODGKIN'S DISEASE: Primary | ICD-10-CM

## 2022-12-15 DIAGNOSIS — L72.3 SEBACEOUS CYST: ICD-10-CM

## 2022-12-15 PROCEDURE — 99024 POSTOP FOLLOW-UP VISIT: CPT | Performed by: SURGERY

## 2022-12-15 ASSESSMENT — ENCOUNTER SYMPTOMS
BLOOD IN STOOL: 0
BACK PAIN: 0
COUGH: 0
CONSTIPATION: 0
RESPIRATORY NEGATIVE: 1
WHEEZING: 0
ABDOMINAL PAIN: 0
VOMITING: 0
GASTROINTESTINAL NEGATIVE: 1
SHORTNESS OF BREATH: 0
NAUSEA: 0
EYES NEGATIVE: 1
DIARRHEA: 0
CHEST TIGHTNESS: 0
ABDOMINAL DISTENTION: 0

## 2022-12-15 NOTE — PROGRESS NOTES
Review of Systems   Constitutional: Negative. Negative for chills, fatigue, fever and unexpected weight change. Eyes: Negative. Negative for visual disturbance. Respiratory: Negative. Negative for cough, chest tightness, shortness of breath and wheezing. Cardiovascular: Negative. Negative for chest pain, palpitations and leg swelling. Gastrointestinal: Negative. Negative for abdominal distention, abdominal pain, blood in stool, constipation, diarrhea, nausea and vomiting. Genitourinary: Negative. Negative for dysuria, hematuria and urgency. Musculoskeletal: Negative. Negative for back pain. Skin: Negative. Negative for rash. Neurological: Negative. Negative for dizziness, seizures, syncope, speech difficulty, weakness, light-headedness, numbness and headaches. Hematological: Negative. Negative for adenopathy. Does not bruise/bleed easily. Psychiatric/Behavioral: Negative. The patient is not nervous/anxious.

## 2023-04-19 DIAGNOSIS — G43.709 CHRONIC MIGRAINE WITHOUT AURA WITHOUT STATUS MIGRAINOSUS, NOT INTRACTABLE: ICD-10-CM

## 2023-04-19 RX ORDER — SUMATRIPTAN 100 MG/1
TABLET, FILM COATED ORAL
Qty: 27 TABLET | Refills: 1 | Status: SHIPPED | OUTPATIENT
Start: 2023-04-19

## 2023-04-19 NOTE — TELEPHONE ENCOUNTER
Terri Rankin is calling to request a refill on the following medication(s):    Medication Request:  Requested Prescriptions     Pending Prescriptions Disp Refills    SUMAtriptan (IMITREX) 100 MG tablet [Pharmacy Med Name: SUMATRIPTAN SUCC 100 MG TABLET] 27 tablet 1     Sig: take 1 tablet by mouth AT ONSET OF HEADACHE may repeat in 2 hours IF headache PERSISTS maximum daily dose of 2 tablets ( 200 milligrams ) every 24 hours       Last Visit Date (If Applicable):  4/98/1362    Next Visit Date:    Visit date not found

## 2023-05-06 PROBLEM — R22.2 MASS ON BACK: Status: ACTIVE | Noted: 2023-05-06

## 2023-10-14 DIAGNOSIS — G43.709 CHRONIC MIGRAINE WITHOUT AURA WITHOUT STATUS MIGRAINOSUS, NOT INTRACTABLE: ICD-10-CM

## 2023-10-16 RX ORDER — SUMATRIPTAN 100 MG/1
TABLET, FILM COATED ORAL
Qty: 27 TABLET | Refills: 1 | OUTPATIENT
Start: 2023-10-16

## 2023-10-16 NOTE — TELEPHONE ENCOUNTER
Matt Jacques is calling to request a refill on the following medication(s):    Medication Request:  Requested Prescriptions     Pending Prescriptions Disp Refills    SUMAtriptan (IMITREX) 100 MG tablet [Pharmacy Med Name: SUMATRIPTAN SUCC 100 MG TABLET] 27 tablet 1     Sig: take 1 tablet by mouth AT ONSET OF HEADACHE may repeat in 2 hours IF headache PERSISTS maximum daily dose of 2 tablets ( 200 milligrams ) every 24 hours       Last Visit Date (If Applicable):  7/4/3706    Next Visit Date:    Visit date not found

## 2024-01-08 DIAGNOSIS — G43.709 CHRONIC MIGRAINE WITHOUT AURA WITHOUT STATUS MIGRAINOSUS, NOT INTRACTABLE: ICD-10-CM

## 2024-01-08 RX ORDER — SUMATRIPTAN 100 MG/1
TABLET, FILM COATED ORAL
Qty: 27 TABLET | Refills: 0 | Status: SHIPPED | OUTPATIENT
Start: 2024-01-08

## 2024-01-08 NOTE — TELEPHONE ENCOUNTER
Aquilino Nowak is calling to request a refill on the following medication(s):    Medication Request:  Requested Prescriptions     Pending Prescriptions Disp Refills    SUMAtriptan (IMITREX) 100 MG tablet [Pharmacy Med Name: SUMATRIPTAN SUCC 100 MG TABLET] 27 tablet 1     Sig: take 1 tablet by mouth AT ONSET OF HEADACHE may repeat in 2 hours IF headache PERSISTS maximum daily dose of 2 tablets ( 200 milligrams ) every 24 hours       Last Visit Date (If Applicable):  5/3/2022    Next Visit Date:    Visit date not found

## 2024-04-01 DIAGNOSIS — G43.709 CHRONIC MIGRAINE WITHOUT AURA WITHOUT STATUS MIGRAINOSUS, NOT INTRACTABLE: ICD-10-CM

## 2024-04-01 NOTE — TELEPHONE ENCOUNTER
Aquilino Nowak is calling to request a refill on the following medication(s):    Medication Request:  Requested Prescriptions     Pending Prescriptions Disp Refills    SUMAtriptan (IMITREX) 100 MG tablet [Pharmacy Med Name: SUMATRIPTAN SUCC 100 MG TABLET] 27 tablet 0     Sig: take 1 tablet by mouth AT ONSET OF HEADACHE may repeat in 2 hours IF headache PERSISTS maximum daily dose of 2 tablets ( 200 milligrams ) every 24 hours       Last Visit Date (If Applicable):  5/3/2022    Next Visit Date:    Visit date not found

## 2024-04-02 RX ORDER — SUMATRIPTAN 100 MG/1
TABLET, FILM COATED ORAL
Qty: 27 TABLET | Refills: 0 | OUTPATIENT
Start: 2024-04-02

## 2024-04-03 ENCOUNTER — TELEPHONE (OUTPATIENT)
Dept: FAMILY MEDICINE CLINIC | Age: 29
End: 2024-04-03

## 2024-04-03 DIAGNOSIS — G43.709 CHRONIC MIGRAINE WITHOUT AURA WITHOUT STATUS MIGRAINOSUS, NOT INTRACTABLE: ICD-10-CM

## 2024-04-03 RX ORDER — SUMATRIPTAN 100 MG/1
TABLET, FILM COATED ORAL
Qty: 27 TABLET | Refills: 0 | Status: SHIPPED | OUTPATIENT
Start: 2024-04-03

## 2024-04-03 NOTE — TELEPHONE ENCOUNTER
Patient made an appointment but couldn't get in until May 9th. He is wondering if you can refill his prescription now since he has an appointment scheduled or if he has to wait until his appointment to get a refill sent in.

## 2024-05-09 ENCOUNTER — OFFICE VISIT (OUTPATIENT)
Dept: FAMILY MEDICINE CLINIC | Age: 29
End: 2024-05-09
Payer: COMMERCIAL

## 2024-05-09 VITALS
WEIGHT: 221 LBS | HEART RATE: 80 BPM | SYSTOLIC BLOOD PRESSURE: 118 MMHG | DIASTOLIC BLOOD PRESSURE: 74 MMHG | OXYGEN SATURATION: 100 % | HEIGHT: 71 IN | BODY MASS INDEX: 30.94 KG/M2

## 2024-05-09 DIAGNOSIS — Z71.89 ACP (ADVANCE CARE PLANNING): ICD-10-CM

## 2024-05-09 DIAGNOSIS — Z00.00 ENCOUNTER FOR WELL ADULT EXAM WITHOUT ABNORMAL FINDINGS: Primary | ICD-10-CM

## 2024-05-09 PROBLEM — R22.2 MASS ON BACK: Status: RESOLVED | Noted: 2023-05-06 | Resolved: 2024-05-09

## 2024-05-09 PROCEDURE — 99395 PREV VISIT EST AGE 18-39: CPT | Performed by: STUDENT IN AN ORGANIZED HEALTH CARE EDUCATION/TRAINING PROGRAM

## 2024-05-09 SDOH — ECONOMIC STABILITY: HOUSING INSECURITY
IN THE LAST 12 MONTHS, WAS THERE A TIME WHEN YOU DID NOT HAVE A STEADY PLACE TO SLEEP OR SLEPT IN A SHELTER (INCLUDING NOW)?: NO

## 2024-05-09 SDOH — ECONOMIC STABILITY: INCOME INSECURITY: HOW HARD IS IT FOR YOU TO PAY FOR THE VERY BASICS LIKE FOOD, HOUSING, MEDICAL CARE, AND HEATING?: NOT HARD AT ALL

## 2024-05-09 SDOH — ECONOMIC STABILITY: FOOD INSECURITY: WITHIN THE PAST 12 MONTHS, THE FOOD YOU BOUGHT JUST DIDN'T LAST AND YOU DIDN'T HAVE MONEY TO GET MORE.: NEVER TRUE

## 2024-05-09 SDOH — ECONOMIC STABILITY: FOOD INSECURITY: WITHIN THE PAST 12 MONTHS, YOU WORRIED THAT YOUR FOOD WOULD RUN OUT BEFORE YOU GOT MONEY TO BUY MORE.: NEVER TRUE

## 2024-05-09 ASSESSMENT — ENCOUNTER SYMPTOMS
EYE DISCHARGE: 0
NAUSEA: 0
CHEST TIGHTNESS: 0
ABDOMINAL PAIN: 0
COUGH: 0
CONSTIPATION: 0
SORE THROAT: 0
DIARRHEA: 0
SHORTNESS OF BREATH: 0
VOMITING: 0
WHEEZING: 0

## 2024-05-09 ASSESSMENT — PATIENT HEALTH QUESTIONNAIRE - PHQ9
1. LITTLE INTEREST OR PLEASURE IN DOING THINGS: NOT AT ALL
SUM OF ALL RESPONSES TO PHQ QUESTIONS 1-9: 0
2. FEELING DOWN, DEPRESSED OR HOPELESS: NOT AT ALL
SUM OF ALL RESPONSES TO PHQ9 QUESTIONS 1 & 2: 0
SUM OF ALL RESPONSES TO PHQ QUESTIONS 1-9: 0

## 2024-05-09 NOTE — PROGRESS NOTES
Well Adult Note  Name: Aquilino Nowak Today’s Date: 2024   MRN: [id number] Sex: Male   Age: 29 y.o. Ethnicity: Non- / Non    : 1995 Race: White (non-)      Aquilino Nowak is here for well adult exam.  History:    He is here today for his annual exam.  He states that he has been doing well overall and has not had any new issues.  He still continues to take Imitrex as needed for his headaches and states that it works well for him.  His vitamin D levels were low on last check and he states that he will take over-the-counter supplement.    Review of Systems   Constitutional:  Negative for appetite change, fatigue and fever.   HENT:  Negative for congestion, ear pain, hearing loss and sore throat.    Eyes:  Negative for discharge and visual disturbance.   Respiratory:  Negative for cough, chest tightness, shortness of breath and wheezing.    Cardiovascular:  Negative for chest pain, palpitations and leg swelling.   Gastrointestinal:  Negative for abdominal pain, constipation, diarrhea, nausea and vomiting.   Genitourinary:  Negative for flank pain, frequency, hematuria and urgency.   Musculoskeletal:  Negative for arthralgias, gait problem, joint swelling and myalgias.   Skin: Negative.    Neurological:  Positive for headaches. Negative for dizziness, weakness and numbness.   Psychiatric/Behavioral: Negative.  Negative for dysphoric mood. The patient is not nervous/anxious.        Allergies   Allergen Reactions    Sulfa Antibiotics Hives         Prior to Visit Medications    Medication Sig Taking? Authorizing Provider   SUMAtriptan (IMITREX) 100 MG tablet take 1 tablet by mouth AT ONSET OF HEADACHE may repeat in 2 hours IF headache PERSISTS maximum daily dose of 2 tablets ( 200 milligrams ) every 24 hours Yes Kathy Taylor MD         Past Medical History:   Diagnosis Date    Headache     Hodgkin's lymphoma (HCC) 2012    Mass on back 2023       Past Surgical History:   Procedure

## 2024-05-09 NOTE — PATIENT INSTRUCTIONS
Incorporated.   Care instructions adapted under license by [facility]. If you have questions about a medical condition or this instruction, always ask your healthcare professional. [employer] disclaims any warranty or liability for your use of this information.

## 2024-06-10 DIAGNOSIS — G43.709 CHRONIC MIGRAINE WITHOUT AURA WITHOUT STATUS MIGRAINOSUS, NOT INTRACTABLE: ICD-10-CM

## 2024-06-11 RX ORDER — SUMATRIPTAN 100 MG/1
TABLET, FILM COATED ORAL
Qty: 27 TABLET | Refills: 0 | OUTPATIENT
Start: 2024-06-11

## 2024-06-11 RX ORDER — SUMATRIPTAN 100 MG/1
TABLET, FILM COATED ORAL
Qty: 27 TABLET | Refills: 0 | Status: SHIPPED | OUTPATIENT
Start: 2024-06-11

## 2024-06-11 NOTE — TELEPHONE ENCOUNTER
Aquilino Nowak is calling to request a refill on the following medication(s):    Medication Request:  Requested Prescriptions     Pending Prescriptions Disp Refills    SUMAtriptan (IMITREX) 100 MG tablet [Pharmacy Med Name: SUMATRIPTAN SUCC 100 MG TABLET] 27 tablet 0     Sig: take 1 tablet by mouth AT ONSET OF HEADACHE may repeat in 2 hours IF headache PERSISTS maximum daily dose of 2 tablets ( 200 milligrams ) every 24 hours       Last Visit Date (If Applicable):  8/30/2022    Next Visit Date:    Visit date not found

## 2024-06-11 NOTE — TELEPHONE ENCOUNTER
Aquilino Nowak is calling to request a refill on the following medication(s):    Medication Request:  Requested Prescriptions     Pending Prescriptions Disp Refills    SUMAtriptan (IMITREX) 100 MG tablet [Pharmacy Med Name: SUMATRIPTAN SUCC 100 MG TABLET] 27 tablet 0     Sig: take 1 tablet by mouth AT ONSET OF HEADACHE may repeat in 2 hours IF headache PERSISTS maximum daily dose of 2 tablets ( 200 milligrams ) every 24 hours       Last Visit Date (If Applicable):  5/9/2024    Next Visit Date:    Visit date not found

## 2024-06-29 DIAGNOSIS — G43.709 CHRONIC MIGRAINE WITHOUT AURA WITHOUT STATUS MIGRAINOSUS, NOT INTRACTABLE: ICD-10-CM

## 2024-07-01 NOTE — TELEPHONE ENCOUNTER
Aquilino Nowak is calling to request a refill on the following medication(s):    Medication Request:  Requested Prescriptions     Pending Prescriptions Disp Refills    SUMAtriptan (IMITREX) 100 MG tablet [Pharmacy Med Name: SUMATRIPTAN SUCC 100 MG TABLET] 27 tablet 0     Sig: take 1 tablet by mouth AT ONSET OF HEADACHE may repeat in 2 hours maximum daily dose of 2 daily       Last Visit Date (If Applicable):  5/9/2024    Next Visit Date:    Visit date not found

## 2024-07-03 RX ORDER — SUMATRIPTAN 100 MG/1
TABLET, FILM COATED ORAL
Qty: 27 TABLET | Refills: 0 | OUTPATIENT
Start: 2024-07-03

## 2024-08-04 DIAGNOSIS — G43.709 CHRONIC MIGRAINE WITHOUT AURA WITHOUT STATUS MIGRAINOSUS, NOT INTRACTABLE: ICD-10-CM

## 2024-08-05 RX ORDER — SUMATRIPTAN 100 MG/1
TABLET, FILM COATED ORAL
Qty: 27 TABLET | Refills: 0 | Status: SHIPPED | OUTPATIENT
Start: 2024-08-05

## 2024-08-05 NOTE — TELEPHONE ENCOUNTER
Aquilino Nowak is calling to request a refill on the following medication(s):    Medication Request:  Requested Prescriptions     Pending Prescriptions Disp Refills    SUMAtriptan (IMITREX) 100 MG tablet [Pharmacy Med Name: SUMATRIPTAN 100MG TABLETS] 27 tablet 0     Sig: TAKE 1 TABLET BY MOUTH AT ONSET OF HEADACHE MAY REPEAT IN 2 HOURS IF HEADACHE PERSISTS. MAX DAILY DOSE OF 2 TABLETS       Last Visit Date (If Applicable):  5/9/2024    Next Visit Date:    Visit date not found

## 2025-01-15 DIAGNOSIS — G43.709 CHRONIC MIGRAINE WITHOUT AURA WITHOUT STATUS MIGRAINOSUS, NOT INTRACTABLE: ICD-10-CM

## 2025-01-15 NOTE — TELEPHONE ENCOUNTER
Aquilino Nowak is calling to request a refill on the following medication(s):    Medication Request:  Requested Prescriptions     Pending Prescriptions Disp Refills    SUMAtriptan (IMITREX) 100 MG tablet 27 tablet 0     Sig: TAKE 1 TABLET BY MOUTH AT ONSET OF HEADACHE MAY REPEAT IN 2 HOURS IF HEADACHE PERSISTS. MAX DAILY DOSE OF 2 TABLETS       Last Visit Date (If Applicable):  5/9/2024    Next Visit Date:    Visit date not found

## 2025-01-15 NOTE — TELEPHONE ENCOUNTER
Requested Prescriptions     Pending Prescriptions Disp Refills    SUMAtriptan (IMITREX) 100 MG tablet 27 tablet 0     Sig: TAKE 1 TABLET BY MOUTH AT ONSET OF HEADACHE MAY REPEAT IN 2 HOURS IF HEADACHE PERSISTS. MAX DAILY DOSE OF 2 TABLETS

## 2025-01-16 RX ORDER — SUMATRIPTAN SUCCINATE 100 MG/1
TABLET ORAL
Qty: 27 TABLET | Refills: 0 | Status: SHIPPED | OUTPATIENT
Start: 2025-01-16

## 2025-01-16 RX ORDER — SUMATRIPTAN SUCCINATE 100 MG/1
TABLET ORAL
Qty: 27 TABLET | Refills: 0 | OUTPATIENT
Start: 2025-01-16

## 2025-05-20 DIAGNOSIS — G43.709 CHRONIC MIGRAINE WITHOUT AURA WITHOUT STATUS MIGRAINOSUS, NOT INTRACTABLE: ICD-10-CM

## 2025-05-20 RX ORDER — SUMATRIPTAN SUCCINATE 100 MG/1
TABLET ORAL
Qty: 27 TABLET | Refills: 0 | Status: SHIPPED | OUTPATIENT
Start: 2025-05-20

## 2025-08-14 DIAGNOSIS — G43.709 CHRONIC MIGRAINE WITHOUT AURA WITHOUT STATUS MIGRAINOSUS, NOT INTRACTABLE: ICD-10-CM

## 2025-08-14 RX ORDER — SUMATRIPTAN SUCCINATE 100 MG/1
TABLET ORAL
Qty: 27 TABLET | Refills: 0 | Status: SHIPPED | OUTPATIENT
Start: 2025-08-14

## (undated) DEVICE — ADHESIVE SKIN CLOSURE 0.7CC TOP MICROBIAL APPL DERMBND ADV

## (undated) DEVICE — PREMIUM DRY TRAY LF: Brand: MEDLINE INDUSTRIES, INC.

## (undated) DEVICE — SUTURE VCRL + SZ 3-0 L27IN ABSRB UD L26MM SH 1/2 CIR VCP416H

## (undated) DEVICE — SOLUTION IV IRRIG POUR BRL 0.9% SODIUM CHL 2F7124

## (undated) DEVICE — YANKAUER,FLEXIBLE HANDLE,REGLR CAPACITY: Brand: MEDLINE INDUSTRIES, INC.

## (undated) DEVICE — MHPB GEN MINOR PACK: Brand: MEDLINE INDUSTRIES, INC.

## (undated) DEVICE — APPLICATOR MEDICATED 26 CC SOLUTION HI LT ORNG CHLORAPREP

## (undated) DEVICE — SUTURE MCRYL + SZ 4-0 L18IN ABSRB UD L19MM PS-2 3/8 CIR MCP496G

## (undated) DEVICE — GLOVE ORANGE PI 7   MSG9070

## (undated) DEVICE — SUTURE PERMAHAND SZ 3-0 L30IN NONABSORBABLE BLK SH L26MM K832H